# Patient Record
Sex: FEMALE | Race: WHITE | NOT HISPANIC OR LATINO | Employment: UNEMPLOYED | URBAN - METROPOLITAN AREA
[De-identification: names, ages, dates, MRNs, and addresses within clinical notes are randomized per-mention and may not be internally consistent; named-entity substitution may affect disease eponyms.]

---

## 2017-02-22 ENCOUNTER — TELEPHONE (OUTPATIENT)
Dept: FAMILY MEDICINE CLINIC | Facility: CLINIC | Age: 27
End: 2017-02-22

## 2017-02-22 RX ORDER — OSELTAMIVIR PHOSPHATE 75 MG/1
75 CAPSULE ORAL DAILY
Qty: 10 CAPSULE | Refills: 0 | Status: SHIPPED | OUTPATIENT
Start: 2017-02-22 | End: 2017-04-27

## 2017-02-22 NOTE — TELEPHONE ENCOUNTER
----- Message from Doretha Canales sent at 2/22/2017 11:55 AM EST -----  Regarding: RX REQUEST  PT;S DAUGHTER WAS PROVEN TO HAVE THE FLU.     HER DAUGHTER'S PEDIA. SUGGESTED THE MOTHER GET TAMIFLU TO PREVENT GETTING THE FLU HERSELF.

## 2017-04-27 ENCOUNTER — OFFICE VISIT (OUTPATIENT)
Dept: OBSTETRICS AND GYNECOLOGY | Age: 27
End: 2017-04-27

## 2017-04-27 VITALS
DIASTOLIC BLOOD PRESSURE: 74 MMHG | BODY MASS INDEX: 30.39 KG/M2 | WEIGHT: 178 LBS | SYSTOLIC BLOOD PRESSURE: 118 MMHG | HEIGHT: 64 IN

## 2017-04-27 DIAGNOSIS — Z01.419 ENCOUNTER FOR GYNECOLOGICAL EXAMINATION WITHOUT ABNORMAL FINDING: Primary | ICD-10-CM

## 2017-04-27 DIAGNOSIS — Z11.3 SCREEN FOR SEXUALLY TRANSMITTED DISEASES: ICD-10-CM

## 2017-04-27 DIAGNOSIS — Z12.4 ROUTINE CERVICAL SMEAR: ICD-10-CM

## 2017-04-27 PROCEDURE — 99395 PREV VISIT EST AGE 18-39: CPT | Performed by: OBSTETRICS & GYNECOLOGY

## 2017-04-27 RX ORDER — ETONOGESTREL/ETHINYL ESTRADIOL .12-.015MG
1 RING, VAGINAL VAGINAL
Qty: 1 EACH | Refills: 11 | Status: SHIPPED | OUTPATIENT
Start: 2017-04-27 | End: 2018-03-12

## 2017-04-27 NOTE — PROGRESS NOTES
"Subjective     Chief Complaint   Patient presents with   • Gynecologic Exam     AC       History of Present Illness    Sindhu Devries is a 27 y.o.  who presents for annual exam. Pt is a homemaker.  Girls - 2 and 3 y/o  She uses the ring for contraception. She was using contiuously - having breast tenderness. No caffine.   Her menses are regular every 28-30 days, lasting 0-3 days, dysmenorrhea none   Obstetric History:  OB History      Para Term  AB TAB SAB Ectopic Multiple Living    2 2 2       2         Menstrual History:     Patient's last menstrual period was 2017.         Current contraception: NuvaRing vaginal inserts  History of abnormal Pap smear: no  Received Gardasil immunization: no  Perform regular self breast exam: yes  Family history of uterine or ovarian cancer: no  Family History of colon cancer: no  Family history of breast cancer: no    Mammogram: not indicated.  Colonoscopy: not indicated.  DEXA: not indicated.    Exercise: not active  Calcium/Vitamin D: adequate intake    The following portions of the patient's history were reviewed and updated as appropriate: allergies, current medications, past family history, past medical history, past social history, past surgical history and problem list.    Review of Systems    Review of Systems   Constitutional: Negative for fatigue.   Respiratory: Negative for shortness of breath.    Gastrointestinal: Negative for abdominal pain.   Genitourinary: Negative for dysuria.   Neurological: Negative for headaches.   Psychiatric/Behavioral: Negative for dysphoric mood.         Objective   Physical Exam    /74  Ht 64\" (162.6 cm)  Wt 178 lb (80.7 kg)  LMP 2017  BMI 30.55 kg/m2    General:   alert, appears stated age and cooperative   Neck: no asymmetry, masses, or scars, no adenopathy and thyroid normal to palpation   Heart: regular rate and rhythm   Lungs: clear to auscultation bilaterally   Abdomen: soft, non-tender, " without masses or organomegaly   Breast: inspection negative, no nipple discharge or bleeding, no masses or nodularity palpable   Vulva: normal, Bartholin's, Urethra, [place]'s normal   Vagina: normal mucosa, normal discharge   Cervix: no cervical motion tenderness and no lesions   Uterus: mobile, non-tender, normal shape and consistency   Adnexa: no mass, fullness, tenderness   Rectal: not indicated     Assessment/Plan   Sindhu was seen today for gynecologic exam.    Diagnoses and all orders for this visit:    Encounter for gynecological examination without abnormal finding    Breast tenderness - try vit E   All questions answered.  Breast self exam technique reviewed and patient encouraged to perform self-exam monthly.  Discussed healthy lifestyle modifications.  Recommended 30 minutes of aerobic exercise five times per week.  Discussed calcium needs to prevent osteoporosis.

## 2017-05-02 ENCOUNTER — TELEPHONE (OUTPATIENT)
Dept: OBSTETRICS AND GYNECOLOGY | Age: 27
End: 2017-05-02

## 2017-05-02 LAB
C TRACH RRNA CVX QL NAA+PROBE: NEGATIVE
CONV .: NORMAL
CYTOLOGIST CVX/VAG CYTO: NORMAL
CYTOLOGY CVX/VAG DOC THIN PREP: NORMAL
DX ICD CODE: NORMAL
HIV 1 & 2 AB SER-IMP: NORMAL
N GONORRHOEA RRNA CVX QL NAA+PROBE: NEGATIVE
OTHER STN SPEC: NORMAL
PATH REPORT.FINAL DX SPEC: NORMAL
STAT OF ADQ CVX/VAG CYTO-IMP: NORMAL

## 2017-05-11 ENCOUNTER — OFFICE VISIT (OUTPATIENT)
Dept: RETAIL CLINIC | Facility: CLINIC | Age: 27
End: 2017-05-11

## 2017-05-11 VITALS
TEMPERATURE: 98 F | RESPIRATION RATE: 16 BRPM | OXYGEN SATURATION: 98 % | HEART RATE: 71 BPM | DIASTOLIC BLOOD PRESSURE: 83 MMHG | SYSTOLIC BLOOD PRESSURE: 134 MMHG

## 2017-05-11 DIAGNOSIS — J06.9 ACUTE URI: Primary | ICD-10-CM

## 2017-05-11 DIAGNOSIS — J02.8 ACUTE PHARYNGITIS DUE TO OTHER SPECIFIED ORGANISMS: ICD-10-CM

## 2017-05-11 LAB
EXPIRATION DATE: NORMAL
INTERNAL CONTROL: NORMAL
Lab: NORMAL
S PYO AG THROAT QL: NEGATIVE

## 2017-05-11 PROCEDURE — 99213 OFFICE O/P EST LOW 20 MIN: CPT | Performed by: NURSE PRACTITIONER

## 2017-05-11 PROCEDURE — 87880 STREP A ASSAY W/OPTIC: CPT | Performed by: NURSE PRACTITIONER

## 2017-05-13 ENCOUNTER — TELEPHONE (OUTPATIENT)
Dept: RETAIL CLINIC | Facility: CLINIC | Age: 27
End: 2017-05-13

## 2017-05-13 RX ORDER — ONDANSETRON 4 MG/1
4 TABLET, FILM COATED ORAL EVERY 8 HOURS PRN
Qty: 9 TABLET | Refills: 0 | Status: SHIPPED | OUTPATIENT
Start: 2017-05-13 | End: 2017-05-16

## 2017-05-13 RX ORDER — AMOXICILLIN 875 MG/1
875 TABLET, COATED ORAL 2 TIMES DAILY
Qty: 20 TABLET | Refills: 0 | Status: SHIPPED | OUTPATIENT
Start: 2017-05-13 | End: 2017-05-23

## 2017-07-05 ENCOUNTER — OFFICE VISIT (OUTPATIENT)
Dept: RETAIL CLINIC | Facility: CLINIC | Age: 27
End: 2017-07-05

## 2017-07-05 DIAGNOSIS — J01.00 ACUTE NON-RECURRENT MAXILLARY SINUSITIS: ICD-10-CM

## 2017-07-05 DIAGNOSIS — H66.003 ACUTE SUPPURATIVE OTITIS MEDIA OF BOTH EARS WITHOUT SPONTANEOUS RUPTURE OF TYMPANIC MEMBRANES, RECURRENCE NOT SPECIFIED: Primary | ICD-10-CM

## 2017-07-05 PROCEDURE — 99213 OFFICE O/P EST LOW 20 MIN: CPT | Performed by: NURSE PRACTITIONER

## 2017-07-05 RX ORDER — AMOXICILLIN 400 MG/5ML
POWDER, FOR SUSPENSION ORAL
Qty: 210 ML | Refills: 0 | Status: SHIPPED | OUTPATIENT
Start: 2017-07-05 | End: 2018-03-12

## 2017-07-05 NOTE — PROGRESS NOTES
Subjective   Sindhu Devries is a 27 y.o. female.     HPI Comments: Pt reports symptoms started 4 weeks ago sinus pressure and mild headache. Coughing at night that has since resolved.     Earache    There is pain in the right ear. This is a new problem. The current episode started 1 to 4 weeks ago (4 weeks ). The problem occurs constantly. The problem has been unchanged. There has been no fever. The pain is at a severity of 8/10. The pain is moderate. Associated symptoms include coughing, headaches and a sore throat. Pertinent negatives include no abdominal pain, ear discharge, neck pain, rash or vomiting. Treatments tried: mucinex  The treatment provided mild relief.        The following portions of the patient's history were reviewed and updated as appropriate: allergies, current medications, past family history, past medical history, past social history, past surgical history and problem list.    Review of Systems   Constitutional: Negative.    HENT: Positive for ear pain and sore throat. Negative for ear discharge.    Eyes: Negative.    Respiratory: Positive for cough.    Cardiovascular: Negative.    Gastrointestinal: Negative.  Negative for abdominal pain and vomiting.   Endocrine: Negative.    Genitourinary: Negative.    Musculoskeletal: Negative.  Negative for neck pain.   Skin: Negative.  Negative for rash.   Allergic/Immunologic: Negative.    Neurological: Positive for headaches.   Hematological: Negative.    Psychiatric/Behavioral: Negative.        Objective   Physical Exam   Constitutional: She is oriented to person, place, and time. Vital signs are normal. She appears well-developed and well-nourished.   HENT:   Head: Normocephalic and atraumatic.   Right Ear: Hearing, external ear and ear canal normal. Tympanic membrane is erythematous.   Left Ear: Hearing, external ear and ear canal normal. Tympanic membrane is erythematous.   Nose: Right sinus exhibits maxillary sinus tenderness. Right sinus exhibits no  frontal sinus tenderness. Left sinus exhibits maxillary sinus tenderness. Left sinus exhibits no frontal sinus tenderness.   Mouth/Throat: Uvula is midline and mucous membranes are normal. Posterior oropharyngeal erythema present. No tonsillar exudate.   Eyes: Conjunctivae and lids are normal. Pupils are equal, round, and reactive to light.   Neck: Trachea normal and normal range of motion. Neck supple.   Cardiovascular: Normal rate, regular rhythm, S1 normal, S2 normal and normal heart sounds.    Pulmonary/Chest: Effort normal and breath sounds normal.   Abdominal: Soft. Normal appearance and bowel sounds are normal. There is no tenderness.   Musculoskeletal: Normal range of motion.   Lymphadenopathy:     She has no cervical adenopathy.   Neurological: She is alert and oriented to person, place, and time.   Skin: Skin is warm, dry and intact. No rash noted.   Psychiatric: She has a normal mood and affect. Her speech is normal and behavior is normal.   Vitals reviewed.      Assessment/Plan   Problems Addressed this Visit     None      Visit Diagnoses     Acute suppurative otitis media of both ears without spontaneous rupture of tympanic membranes, recurrence not specified    -  Primary    Acute non-recurrent maxillary sinusitis

## 2017-07-05 NOTE — PATIENT INSTRUCTIONS

## 2017-11-16 ENCOUNTER — TELEPHONE (OUTPATIENT)
Dept: OBSTETRICS AND GYNECOLOGY | Age: 27
End: 2017-11-16

## 2017-11-16 NOTE — TELEPHONE ENCOUNTER
[facility]. Pt is currently trying to have a baby and wanted to make sure it was okay for her to take Azithromycin.

## 2018-03-12 ENCOUNTER — PROCEDURE VISIT (OUTPATIENT)
Dept: OBSTETRICS AND GYNECOLOGY | Age: 28
End: 2018-03-12

## 2018-03-12 ENCOUNTER — INITIAL PRENATAL (OUTPATIENT)
Dept: OBSTETRICS AND GYNECOLOGY | Age: 28
End: 2018-03-12

## 2018-03-12 VITALS — WEIGHT: 191 LBS | SYSTOLIC BLOOD PRESSURE: 152 MMHG | DIASTOLIC BLOOD PRESSURE: 96 MMHG | BODY MASS INDEX: 32.79 KG/M2

## 2018-03-12 DIAGNOSIS — O36.80X0 ENCOUNTER TO DETERMINE FETAL VIABILITY OF PREGNANCY, SINGLE OR UNSPECIFIED FETUS: ICD-10-CM

## 2018-03-12 DIAGNOSIS — Z3A.09 9 WEEKS GESTATION OF PREGNANCY: Primary | ICD-10-CM

## 2018-03-12 DIAGNOSIS — Z11.3 SCREEN FOR STD (SEXUALLY TRANSMITTED DISEASE): ICD-10-CM

## 2018-03-12 DIAGNOSIS — Z34.81 ENCOUNTER FOR SUPERVISION OF OTHER NORMAL PREGNANCY IN FIRST TRIMESTER: ICD-10-CM

## 2018-03-12 PROBLEM — Z34.90 PREGNANCY: Status: ACTIVE | Noted: 2018-03-12

## 2018-03-12 LAB — VZV IGG SER QL: NORMAL

## 2018-03-12 PROCEDURE — 0501F PRENATAL FLOW SHEET: CPT | Performed by: OBSTETRICS & GYNECOLOGY

## 2018-03-12 PROCEDURE — 76817 TRANSVAGINAL US OBSTETRIC: CPT | Performed by: OBSTETRICS & GYNECOLOGY

## 2018-03-12 RX ORDER — DOCONEXENT, NIACINAMIDE, .ALPHA.-TOCOPHEROL ACETATE, DL-, CHOLECALCIFEROL, .BETA.-CAROTENE, ASCORBIC ACID, THIAMINE MONONITRATE, RIBOFLAVIN, PYRIDOXINE HYDROCHLORIDE, CYANOCOBALAMIN, IRON, ZINC OXIDE, CUPRIC OXIDE, POTASSIUM IODIDE, MAGNESIUM OXIDE, FOLIC ACID, AND LEVOMEFOLATE CALCIUM 200; 15; 20; 1000; 1100; 30; 1.6; 1.8; 2.5; 12; 29; 25; 2; 150; 20; .4; .6 MG/1; MG/1; [IU]/1; [IU]/1; [IU]/1; MG/1; MG/1; MG/1; MG/1; UG/1; MG/1; MG/1; MG/1; UG/1; MG/1; MG/1; MG/1
200 CAPSULE, LIQUID FILLED ORAL DAILY
Qty: 30 CAPSULE | Refills: 11 | Status: SHIPPED | OUTPATIENT
Start: 2018-03-12 | End: 2019-12-19

## 2018-03-12 RX ORDER — DOXYLAMINE SUCCINATE AND PYRIDOXINE HYDROCHLORIDE, DELAYED RELEASE TABLETS 10 MG/10 MG 10; 10 MG/1; MG/1
TABLET, DELAYED RELEASE ORAL
Qty: 100 TABLET | Refills: 3 | Status: SHIPPED | OUTPATIENT
Start: 2018-03-12 | End: 2018-05-30

## 2018-03-12 RX ORDER — FERROUS SULFATE 325(65) MG
325 TABLET ORAL
Qty: 30 TABLET | Refills: 9 | Status: SHIPPED | OUTPATIENT
Start: 2018-03-12 | End: 2018-10-12 | Stop reason: HOSPADM

## 2018-03-12 NOTE — PROGRESS NOTES
Subjective     Sindhu Devries is being seen today for her first obstetrical visit.  The patient has had 2 term vaginal deliveries of 2 daughters.  Her prior pregnancies were uncomplicated.  She does desire first trimester testing.  She also desires an induction of labor.  She is sure of her last menstrual period.  She is having some nausea but no vaginal bleeding.  This is a planned pregnancy. She is at 9w2d gestation.  She is a homemaker.  She previously had chickenpox as a child.    Menstrual History:  OB History      Para Term  AB Living    3 2 2   2    SAB TAB Ectopic Molar Multiple Live Births         2        Obstetric Comments    No complications. Term inductions.            Patient's last menstrual period was 2018 (exact date).       The following portions of the patient's history were reviewed and updated as appropriate: allergies, current medications, past family history, past medical history, past social history, past surgical history and problem list.    Review of Systems  A comprehensive review of systems was negative except for: nausea      Objective     /96   Wt 86.6 kg (191 lb)   LMP 2018 (Exact Date)   BMI 32.79 kg/m²   General appearance: alert, appears stated age and cooperative  Neck: no adenopathy, no carotid bruit and thyroid not enlarged, symmetric, no tenderness/mass/nodules  Heart: regular rate and rhythm  Abdomen: soft, non-tender; bowel sounds normal; no masses,  no organomegaly  Pelvic: cervix normal in appearance, external genitalia normal, no adnexal masses or tenderness, no cervical motion tenderness, rectovaginal septum normal, uterus normal size, shape, and consistency and vagina normal without discharge      Assessment     Pregnancy at 9 and 2/7 weeks      Plan     Initial labs drawn.  Prenatal vitamins.  Problem list reviewed and updated.  OB information was reviewed.  We discussed third pregnancies can be more uncomfortable.  Patient desires term  induction of labor.  She is traveling to [place] in August.  We discussed the risk of mosquito  viruses.  Follow up in 4 weeks.  .

## 2018-03-13 ENCOUNTER — TELEPHONE (OUTPATIENT)
Dept: OBSTETRICS AND GYNECOLOGY | Age: 28
End: 2018-03-13

## 2018-03-13 LAB
ABO GROUP BLD: (no result)
BASOPHILS # BLD AUTO: 0 X10E3/UL (ref 0–0.2)
BASOPHILS NFR BLD AUTO: 1 %
BLD GP AB SCN SERPL QL: NEGATIVE
C TRACH RRNA SPEC QL NAA+PROBE: NEGATIVE
EOSINOPHIL # BLD AUTO: 0.1 X10E3/UL (ref 0–0.4)
EOSINOPHIL NFR BLD AUTO: 2 %
ERYTHROCYTE [DISTWIDTH] IN BLOOD BY AUTOMATED COUNT: 13.1 % (ref 12.3–15.4)
HBV SURFACE AG SERPL QL IA: NEGATIVE
HCT VFR BLD AUTO: 37.3 % (ref 34–46.6)
HGB BLD-MCNC: 12.9 G/DL (ref 11.1–15.9)
HIV 1+2 AB+HIV1 P24 AG SERPL QL IA: NON REACTIVE
IMM GRANULOCYTES # BLD: 0 X10E3/UL (ref 0–0.1)
IMM GRANULOCYTES NFR BLD: 0 %
LYMPHOCYTES # BLD AUTO: 1.6 X10E3/UL (ref 0.7–3.1)
LYMPHOCYTES NFR BLD AUTO: 24 %
MCH RBC QN AUTO: 30 PG (ref 26.6–33)
MCHC RBC AUTO-ENTMCNC: 34.6 G/DL (ref 31.5–35.7)
MCV RBC AUTO: 87 FL (ref 79–97)
MONOCYTES # BLD AUTO: 0.3 X10E3/UL (ref 0.1–0.9)
MONOCYTES NFR BLD AUTO: 5 %
N GONORRHOEA RRNA SPEC QL NAA+PROBE: NEGATIVE
NEUTROPHILS # BLD AUTO: 4.6 X10E3/UL (ref 1.4–7)
NEUTROPHILS NFR BLD AUTO: 68 %
PLATELET # BLD AUTO: 306 X10E3/UL (ref 150–379)
RBC # BLD AUTO: 4.3 X10E6/UL (ref 3.77–5.28)
RH BLD: POSITIVE
RPR SER QL: NON REACTIVE
RUBV IGG SERPL IA-ACNC: 1.91 INDEX
WBC # BLD AUTO: 6.7 X10E3/UL (ref 3.4–10.8)

## 2018-03-14 LAB
BACTERIA UR CULT: NORMAL
BACTERIA UR CULT: NORMAL

## 2018-03-20 ENCOUNTER — TELEPHONE (OUTPATIENT)
Dept: OBSTETRICS AND GYNECOLOGY | Age: 28
End: 2018-03-20

## 2018-03-26 ENCOUNTER — TELEPHONE (OUTPATIENT)
Dept: OBSTETRICS AND GYNECOLOGY | Age: 28
End: 2018-03-26

## 2018-03-26 NOTE — TELEPHONE ENCOUNTER
----- Message from Chilango Mccarty MD sent at 3/26/2018 10:22 AM EDT -----  Please notify first trimester testing is normal.  Notify of gender if the patient would like to know.

## 2018-03-28 ENCOUNTER — TELEPHONE (OUTPATIENT)
Dept: OBSTETRICS AND GYNECOLOGY | Age: 28
End: 2018-03-28

## 2018-04-09 ENCOUNTER — ROUTINE PRENATAL (OUTPATIENT)
Dept: OBSTETRICS AND GYNECOLOGY | Age: 28
End: 2018-04-09

## 2018-04-09 VITALS — DIASTOLIC BLOOD PRESSURE: 80 MMHG | WEIGHT: 192 LBS | SYSTOLIC BLOOD PRESSURE: 124 MMHG | BODY MASS INDEX: 32.96 KG/M2

## 2018-04-09 DIAGNOSIS — Z34.02 ENCOUNTER FOR SUPERVISION OF NORMAL FIRST PREGNANCY IN SECOND TRIMESTER: Primary | ICD-10-CM

## 2018-04-09 PROCEDURE — 0502F SUBSEQUENT PRENATAL CARE: CPT | Performed by: OBSTETRICS & GYNECOLOGY

## 2018-04-09 NOTE — PROGRESS NOTES
Patient is still having some nausea and vomiting on and off.  She is taking B6 and antihistamines.  She is also having some acne on her back.  First trimester testing was normal.  Baby is a girl.    Positive Doppler heart tones today.    Assessment-13 weeks normal pregnancy in third girl.  Discussed frequent small meals and foods to try.  She will continue with B6.  AFP at next visit.

## 2018-05-09 ENCOUNTER — ROUTINE PRENATAL (OUTPATIENT)
Dept: OBSTETRICS AND GYNECOLOGY | Age: 28
End: 2018-05-09

## 2018-05-09 VITALS — DIASTOLIC BLOOD PRESSURE: 80 MMHG | BODY MASS INDEX: 33.3 KG/M2 | WEIGHT: 194 LBS | SYSTOLIC BLOOD PRESSURE: 120 MMHG

## 2018-05-09 DIAGNOSIS — Z34.82 ENCOUNTER FOR SUPERVISION OF OTHER NORMAL PREGNANCY IN SECOND TRIMESTER: Primary | ICD-10-CM

## 2018-05-09 PROCEDURE — 0502F SUBSEQUENT PRENATAL CARE: CPT | Performed by: OBSTETRICS & GYNECOLOGY

## 2018-05-09 NOTE — PROGRESS NOTES
Chief Complaint   Patient presents with   • Routine Prenatal Visit     HPI- Pt is 28 y.o.  at 17w4d here for prenatal visit. Nausea is better.  C/o restless legs   ROS-     - No vaginal bleeding    GI- No abdominal pain  /80   Wt 88 kg (194 lb)   LMP 2018 (Exact Date)   BMI 33.30 kg/m²   Exam - See flow sheet  Normal weight gain   Assessment-  17 weeks - AFP and add iron

## 2018-05-12 LAB
AFP ADJ MOM SERPL: 0.87
AFP INTERP SERPL-IMP: NORMAL
AFP INTERP SERPL-IMP: NORMAL
AFP SERPL-MCNC: 29.8 NG/ML
AGE AT DELIVERY: 26.5 YR
GA METHOD: NORMAL
GA: 17.6 WEEKS
IDDM PATIENT QL: NO
LABORATORY COMMENT REPORT: NORMAL
MULTIPLE PREGNANCY: NO
NEURAL TUBE DEFECT RISK FETUS: NORMAL %
RESULT: NORMAL

## 2018-05-14 ENCOUNTER — TELEPHONE (OUTPATIENT)
Dept: OBSTETRICS AND GYNECOLOGY | Age: 28
End: 2018-05-14

## 2018-05-30 ENCOUNTER — ROUTINE PRENATAL (OUTPATIENT)
Dept: OBSTETRICS AND GYNECOLOGY | Age: 28
End: 2018-05-30

## 2018-05-30 ENCOUNTER — PROCEDURE VISIT (OUTPATIENT)
Dept: OBSTETRICS AND GYNECOLOGY | Age: 28
End: 2018-05-30

## 2018-05-30 VITALS — DIASTOLIC BLOOD PRESSURE: 74 MMHG | WEIGHT: 194 LBS | BODY MASS INDEX: 33.3 KG/M2 | SYSTOLIC BLOOD PRESSURE: 108 MMHG

## 2018-05-30 DIAGNOSIS — O26.812 PREGNANCY RELATED FATIGUE IN SECOND TRIMESTER: Primary | ICD-10-CM

## 2018-05-30 DIAGNOSIS — G25.81 RESTLESS LEG: ICD-10-CM

## 2018-05-30 DIAGNOSIS — Z34.92 SECOND TRIMESTER FETUS: Primary | ICD-10-CM

## 2018-05-30 PROCEDURE — 0502F SUBSEQUENT PRENATAL CARE: CPT | Performed by: OBSTETRICS & GYNECOLOGY

## 2018-05-30 PROCEDURE — 76805 OB US >/= 14 WKS SNGL FETUS: CPT | Performed by: OBSTETRICS & GYNECOLOGY

## 2018-05-30 NOTE — PROGRESS NOTES
Patient is here today for anatomy ultrasound.  She has started iron but is still having complaint of restless legs.  She would like to have blood work drawn today.  No vaginal bleeding and positive fetal movement.    Anatomy ultrasound shows female gender.  Size greater than dates by 5 days.  Cervical length is normal at 4.6.  Anatomy is normal within the limits of ultrasound.  Baby is vertex.  Placenta is anterior with no previa.    Assessment-20 weeks  We will check CBC and ferritin today due to restless legs.  Patient will return in 4 weeks.

## 2018-05-31 ENCOUNTER — TELEPHONE (OUTPATIENT)
Dept: OBSTETRICS AND GYNECOLOGY | Age: 28
End: 2018-05-31

## 2018-05-31 LAB
ERYTHROCYTE [DISTWIDTH] IN BLOOD BY AUTOMATED COUNT: 13.8 % (ref 11.7–13)
FERRITIN SERPL-MCNC: 19.94 NG/ML (ref 13–150)
HCT VFR BLD AUTO: 33.8 % (ref 35.6–45.5)
HGB BLD-MCNC: 11 G/DL (ref 11.9–15.5)
MCH RBC QN AUTO: 30.3 PG (ref 26.9–32)
MCHC RBC AUTO-ENTMCNC: 32.5 G/DL (ref 32.4–36.3)
MCV RBC AUTO: 93.1 FL (ref 80.5–98.2)
PLATELET # BLD AUTO: 225 10*3/MM3 (ref 140–500)
RBC # BLD AUTO: 3.63 10*6/MM3 (ref 3.9–5.2)
WBC # BLD AUTO: 7.52 10*3/MM3 (ref 4.5–10.7)

## 2018-05-31 NOTE — TELEPHONE ENCOUNTER
----- Message from Chilango Mccarty MD sent at 5/31/2018  8:30 AM EDT -----  Please notify hemoglobin is low at 11.  Consistent with mild anemia.  Patient should continue her iron daily.

## 2018-06-19 ENCOUNTER — TELEPHONE (OUTPATIENT)
Dept: OBSTETRICS AND GYNECOLOGY | Age: 28
End: 2018-06-19

## 2018-06-19 NOTE — TELEPHONE ENCOUNTER
Dr Lopez pt, 23 wks preg. Pt states urgency to use restroom but hardly goes. Burn with urination, pharm on file.    Pt # [phone]

## 2018-06-25 ENCOUNTER — ROUTINE PRENATAL (OUTPATIENT)
Dept: OBSTETRICS AND GYNECOLOGY | Age: 28
End: 2018-06-25

## 2018-06-25 VITALS — DIASTOLIC BLOOD PRESSURE: 88 MMHG | WEIGHT: 197 LBS | SYSTOLIC BLOOD PRESSURE: 122 MMHG | BODY MASS INDEX: 33.81 KG/M2

## 2018-06-25 DIAGNOSIS — Z34.82 ENCOUNTER FOR SUPERVISION OF OTHER NORMAL PREGNANCY IN SECOND TRIMESTER: Primary | ICD-10-CM

## 2018-06-25 DIAGNOSIS — N30.00 ACUTE CYSTITIS WITHOUT HEMATURIA: ICD-10-CM

## 2018-06-25 PROBLEM — N39.0 UTI (URINARY TRACT INFECTION): Status: ACTIVE | Noted: 2018-06-25

## 2018-06-25 PROCEDURE — 0502F SUBSEQUENT PRENATAL CARE: CPT | Performed by: OBSTETRICS & GYNECOLOGY

## 2018-06-25 NOTE — PROGRESS NOTES
Patient came in with dysuria last week.  Her urine culture showed Escherichia coli.  Patient is currently on Keflex for 7 days.  Her UTI symptoms have resolved.  She does have some pressure in her right ear which is improving with Tylenol.  She notes good fetal movement.  No vaginal bleeding or contractions.    Exam-normal Doppler heart tones and fundal height.  Blood pressure is normal today.    Assessment-24 weeks  Complete antibiotics for UTI.  Continue Tylenol and fluids for year pressure.  If worsens patient will go to primary care.

## 2018-07-09 ENCOUNTER — APPOINTMENT (OUTPATIENT)
Dept: ULTRASOUND IMAGING | Facility: HOSPITAL | Age: 28
End: 2018-07-09

## 2018-07-09 ENCOUNTER — HOSPITAL ENCOUNTER (INPATIENT)
Facility: HOSPITAL | Age: 28
LOS: 2 days | Discharge: HOME | End: 2018-07-11
Attending: OBSTETRICS & GYNECOLOGY | Admitting: OBSTETRICS & GYNECOLOGY

## 2018-07-09 ENCOUNTER — TELEPHONE (OUTPATIENT)
Dept: OBSTETRICS AND GYNECOLOGY | Age: 28
End: 2018-07-09

## 2018-07-09 DIAGNOSIS — O26.899 ABDOMINAL PAIN DURING PREGNANCY, ANTEPARTUM: ICD-10-CM

## 2018-07-09 DIAGNOSIS — R10.9 ABDOMINAL PAIN DURING PREGNANCY, ANTEPARTUM: ICD-10-CM

## 2018-07-09 DIAGNOSIS — Z3A.26 26 WEEKS GESTATION OF PREGNANCY: ICD-10-CM

## 2018-07-09 DIAGNOSIS — N39.0 COMPLICATED UTI (URINARY TRACT INFECTION): Primary | ICD-10-CM

## 2018-07-09 LAB
ALBUMIN SERPL-MCNC: 3.7 G/DL (ref 3.5–5.2)
ALBUMIN/GLOB SERPL: 1.2 G/DL
ALP SERPL-CCNC: 96 U/L (ref 39–117)
ALT SERPL W P-5'-P-CCNC: 7 U/L (ref 1–33)
ANION GAP SERPL CALCULATED.3IONS-SCNC: 14.9 MMOL/L
AST SERPL-CCNC: 10 U/L (ref 1–32)
BACTERIA UR QL AUTO: ABNORMAL /HPF
BASOPHILS # BLD AUTO: 0.02 10*3/MM3 (ref 0–0.2)
BASOPHILS NFR BLD AUTO: 0.2 % (ref 0–1.5)
BILIRUB SERPL-MCNC: 0.6 MG/DL (ref 0.1–1.2)
BILIRUB UR QL STRIP: NEGATIVE
BUN BLD-MCNC: 7 MG/DL (ref 6–20)
BUN/CREAT SERPL: 12.1 (ref 7–25)
CALCIUM SPEC-SCNC: 8.9 MG/DL (ref 8.6–10.5)
CHLORIDE SERPL-SCNC: 101 MMOL/L (ref 98–107)
CLARITY UR: ABNORMAL
CO2 SERPL-SCNC: 21.1 MMOL/L (ref 22–29)
COLOR UR: YELLOW
CREAT BLD-MCNC: 0.58 MG/DL (ref 0.57–1)
DEPRECATED RDW RBC AUTO: 43.8 FL (ref 37–54)
EOSINOPHIL # BLD AUTO: 0.08 10*3/MM3 (ref 0–0.7)
EOSINOPHIL NFR BLD AUTO: 0.9 % (ref 0.3–6.2)
ERYTHROCYTE [DISTWIDTH] IN BLOOD BY AUTOMATED COUNT: 13.2 % (ref 11.7–13)
GFR SERPL CREATININE-BSD FRML MDRD: 126 ML/MIN/1.73
GLOBULIN UR ELPH-MCNC: 3.2 GM/DL
GLUCOSE BLD-MCNC: 70 MG/DL (ref 65–99)
GLUCOSE UR STRIP-MCNC: NEGATIVE MG/DL
HCT VFR BLD AUTO: 32.8 % (ref 35.6–45.5)
HGB BLD-MCNC: 10.9 G/DL (ref 11.9–15.5)
HGB UR QL STRIP.AUTO: ABNORMAL
HYALINE CASTS UR QL AUTO: ABNORMAL /LPF
IMM GRANULOCYTES # BLD: 0.03 10*3/MM3 (ref 0–0.03)
IMM GRANULOCYTES NFR BLD: 0.3 % (ref 0–0.5)
KETONES UR QL STRIP: ABNORMAL
LEUKOCYTE ESTERASE UR QL STRIP.AUTO: ABNORMAL
LYMPHOCYTES # BLD AUTO: 1.53 10*3/MM3 (ref 0.9–4.8)
LYMPHOCYTES NFR BLD AUTO: 16.6 % (ref 19.6–45.3)
MCH RBC QN AUTO: 30.2 PG (ref 26.9–32)
MCHC RBC AUTO-ENTMCNC: 33.2 G/DL (ref 32.4–36.3)
MCV RBC AUTO: 90.9 FL (ref 80.5–98.2)
MONOCYTES # BLD AUTO: 0.51 10*3/MM3 (ref 0.2–1.2)
MONOCYTES NFR BLD AUTO: 5.5 % (ref 5–12)
NEUTROPHILS # BLD AUTO: 7.1 10*3/MM3 (ref 1.9–8.1)
NEUTROPHILS NFR BLD AUTO: 76.8 % (ref 42.7–76)
NITRITE UR QL STRIP: POSITIVE
PH UR STRIP.AUTO: 6 [PH] (ref 5–8)
PLATELET # BLD AUTO: 249 10*3/MM3 (ref 140–500)
PMV BLD AUTO: 10.4 FL (ref 6–12)
POTASSIUM BLD-SCNC: 4.6 MMOL/L (ref 3.5–5.2)
PROT SERPL-MCNC: 6.9 G/DL (ref 6–8.5)
PROT UR QL STRIP: ABNORMAL
RBC # BLD AUTO: 3.61 10*6/MM3 (ref 3.9–5.2)
RBC # UR: ABNORMAL /HPF
REF LAB TEST METHOD: ABNORMAL
SODIUM BLD-SCNC: 137 MMOL/L (ref 136–145)
SP GR UR STRIP: 1.02 (ref 1–1.03)
SQUAMOUS #/AREA URNS HPF: ABNORMAL /HPF
UROBILINOGEN UR QL STRIP: ABNORMAL
WBC NRBC COR # BLD: 9.24 10*3/MM3 (ref 4.5–10.7)
WBC UR QL AUTO: ABNORMAL /HPF

## 2018-07-09 PROCEDURE — 99221 1ST HOSP IP/OBS SF/LOW 40: CPT | Performed by: OBSTETRICS & GYNECOLOGY

## 2018-07-09 PROCEDURE — 87086 URINE CULTURE/COLONY COUNT: CPT | Performed by: OBSTETRICS & GYNECOLOGY

## 2018-07-09 PROCEDURE — 76805 OB US >/= 14 WKS SNGL FETUS: CPT

## 2018-07-09 PROCEDURE — 81001 URINALYSIS AUTO W/SCOPE: CPT | Performed by: OBSTETRICS & GYNECOLOGY

## 2018-07-09 PROCEDURE — 76817 TRANSVAGINAL US OBSTETRIC: CPT

## 2018-07-09 PROCEDURE — 25010000003 CEFTRIAXONE PER 250 MG: Performed by: OBSTETRICS & GYNECOLOGY

## 2018-07-09 PROCEDURE — 85025 COMPLETE CBC W/AUTO DIFF WBC: CPT | Performed by: OBSTETRICS & GYNECOLOGY

## 2018-07-09 PROCEDURE — 87186 SC STD MICRODIL/AGAR DIL: CPT | Performed by: OBSTETRICS & GYNECOLOGY

## 2018-07-09 PROCEDURE — 80053 COMPREHEN METABOLIC PANEL: CPT | Performed by: OBSTETRICS & GYNECOLOGY

## 2018-07-09 RX ORDER — ACETAMINOPHEN 325 MG/1
325 TABLET ORAL EVERY 6 HOURS PRN
Status: DISCONTINUED | OUTPATIENT
Start: 2018-07-09 | End: 2018-07-10

## 2018-07-09 RX ORDER — CEFTRIAXONE SODIUM 2 G/50ML
2 INJECTION, SOLUTION INTRAVENOUS EVERY 24 HOURS
Status: DISCONTINUED | OUTPATIENT
Start: 2018-07-09 | End: 2018-07-10

## 2018-07-09 RX ORDER — SODIUM CHLORIDE 0.9 % (FLUSH) 0.9 %
1-10 SYRINGE (ML) INJECTION AS NEEDED
Status: DISCONTINUED | OUTPATIENT
Start: 2018-07-09 | End: 2018-07-11 | Stop reason: HOSPADM

## 2018-07-09 RX ORDER — SODIUM CHLORIDE 9 MG/ML
125 INJECTION, SOLUTION INTRAVENOUS CONTINUOUS
Status: DISCONTINUED | OUTPATIENT
Start: 2018-07-09 | End: 2018-07-11 | Stop reason: HOSPADM

## 2018-07-09 RX ADMIN — ACETAMINOPHEN 325 MG: 325 TABLET, FILM COATED ORAL at 19:16

## 2018-07-09 RX ADMIN — CEFTRIAXONE SODIUM 2 G: 2 INJECTION, SOLUTION INTRAVENOUS at 18:16

## 2018-07-09 RX ADMIN — SODIUM CHLORIDE 125 ML/HR: 9 INJECTION, SOLUTION INTRAVENOUS at 18:05

## 2018-07-09 NOTE — NON STRESS TEST
Sindhu Devries, a  at 26w2d with an AMIRAH of 10/13/2018, by Last Menstrual Period, was seen at [facility]     No NST charge per gestation 26.2, monitoring only.    Chief Complaint   Patient presents with   • Abdominal Cramping     Pt states she has been cramping since this morning. She was treated for a UTI a few weeks ago and states that she has noticed some mild burning and frequency today. She denies leaking/bleeding.       Patient Active Problem List   Diagnosis   • Pregnancy   • Restless leg   • UTI (urinary tract infection)   • Abdominal pain during pregnancy, antepartum   • Complicated UTI (urinary tract infection)       Start Time: 1540  Stop Time: 1654

## 2018-07-09 NOTE — TELEPHONE ENCOUNTER
Pt 26 wks preg, lower abd cramping since 9 am today. Pt denies any bleeding or change in dc. Pt states she is still feeling baby move, but FM not consistent yet. Please advise.      Pt # [phone]

## 2018-07-09 NOTE — PROGRESS NOTES
Addendum: UA with positive nitrites and large leukocyte esterase. OB ultrasound shows normal growth/naila and normal cervical length at 4.5 cm. Therefore, recurrent UTI is likely source of symptoms. Will admit for IV abx with Rocephin as pt noting significant discomfort with some occ radiation of pain into flank and episodes of nausea. Prior urine culture reviewed and organism was sensitive to Rocephin. Repeat urine culture ordered and contacted lab to confirm study sent prior to starting antibiotic.

## 2018-07-09 NOTE — H&P
[facility]  Obstetric History and Physical    Chief Complaint   Patient presents with   • Abdominal Cramping     Pt states she has been cramping since this morning. She was treated for a UTI a few weeks ago and states that she has noticed some mild burning and frequency today. She denies leaking/bleeding.       Subjective     Patient is a 28 y.o. female  currently at 26w2d, who presents with midline low abdominal pain that started around 0900. She denies vaginal bleeding or leaking fluid. She notes normal fetal movement. She describes the pain as cramping. It is only located in low abdomen in midline. She notes mild nausea today but denies vomiting or diarrhea and was able to eat a normal lunch earlier. She denies recent intercourse or strenuous activity. She had a UTI a few weeks ago and reports then she had severe dysuria, frequency and urgency. She reports her current symptoms are different but she may have some minimal burning/frequency. She denies fever.     Her prenatal care is complicated by UTI 2 weeks ago.  Her previous obstetric/gynecological history is noted for 2 prior term vaginal deliveries    The following portions of the patients history were reviewed and updated as appropriate: current medications, allergies, past medical history, past surgical history, past family history, past social history and problem list .       Prenatal Information:  Prenatal Results     Initial Prenatal Labs     Test Value Reference Range Date Time    Hemoglobin 11.0 g/dL (L) 11.9 - 15.5 g/dL 18 1110    Hematocrit 33.8 % (L) 35.6 - 45.5 % 18 1110    Platelets 225 10*3/mm3 140 - 500 10*3/mm3 18 1110    Rubella IgG 1.91 index Immune >0.99 index 18 1137    Hepatitis B SAg Negative  Negative 18 1137    Hepatitis C Ab 0.2 s/co ratio 0.0 - 0.9 s/co ratio 16 1531    RPR Non Reactive  Non Reactive 18 1137    ABO O   18 1137    Rh Positive   18 1137    Antibody Screen  Negative  Negative 03/12/18 1137    HIV Non Reactive  Non Reactive 03/12/18 1137    Urine Culture Final report  (A)  06/20/18 1344    Gonorrhea Negative  Negative 03/12/18 1000    Chlamydia Negative  Negative 03/12/18 1000    TSH              2nd and 3rd Trimester     Test Value Reference Range Date Time    Hemoglobin (repeated)        Hematocrit (repeated)        GCT        Antibody Screen (repeated)        GTT Fasting        GTT 1 Hr        GTT 2 Hr        GTT 3 Hr        Group B Strep              Drug Screening     Test Value Reference Range Date Time    Amphetamine Screen        Barbiturate Screen        Benzodiazepine Screen        Methadone Screen        Phencyclidine Screen        Opiates Screen        THC Screen        Cocaine Screen        Propoxyphene Screen        Buprenorphine Screen        Methamphetamine Screen        Oxycodone Screen        Tryicyclic Antidepressants Screen              Other (Risk screening)     Test Value Reference Range Date Time    Varicella IgG positive h/o    03/12/18     Parvovirus IgG        CMV IgG        Cystic Fibrosis        Hemoglobin electrophoresis        NIPT        MSAFP-4        AFP (for NTD only) *Screen Negative*   05/09/18 1222              External Prenatal Results     Pregnancy Outside Results - Transcribed From Office Records - See Scanned Records For Details     Test Value Date Time    Hgb 11.0 g/dL (L) 05/30/18 1110    Hct 33.8 % (L) 05/30/18 1110    ABO O  03/12/18 1137    Rh Positive  03/12/18 1137    Antibody Screen Negative  03/12/18 1137    Glucose Fasting GTT       Glucose Tolerance Test 1 hour       Glucose Tolerance Test 3 hour       Gonorrhea (discrete) Negative  03/12/18 1000    Chlamydia (discrete) Negative  03/12/18 1000    RPR Non Reactive  03/12/18 1137    VDRL       Syphilis Antibody       Rubella 1.91 index 03/12/18 1137    HBsAg Negative  03/12/18 1137    Herpes Simplex Virus PCR       Herpes Simplex VIrus Culture       HIV Non Reactive   18 1137    Hep C RNA Quant PCR       Hep C Antibody 0.2 s/co ratio 16 1531    AFP 29.8 ng/mL 18 1222    Group B Strep       GBS Susceptibility to Clindamycin       GBS Susceptibility to Erythromycin       Fetal Fibronectin       Genetic Testing, Maternal Blood             Drug Screening     Test Value Date Time    Urine Drug Screen       Amphetamine Screen       Barbiturate Screen       Benzodiazepine Screen       Methadone Screen       Phencyclidine Screen       Opiates Screen       THC Screen       Cocaine Screen       Propoxyphene Screen       Buprenorphine Screen       Methamphetamine Screen       Oxycodone Screen       Tricyclic Antidepressants Screen                    Past OB History:     Obstetric History       T2      L2     SAB0   TAB0   Ectopic0   Molar0   Multiple0   Live Births2       # Outcome Date GA Lbr Christopher/2nd Weight Sex Delivery Anes PTL Lv   3 Current            2 Term 14   3289 g (7 lb 4 oz) F Vag-Spont   DIVYA      Name: Diana   1 Term 13   3685 g (8 lb 2 oz) F Vag-Spont   DIVYA      Name: Ashlyn      Obstetric Comments   No complications. Term inductions.        Past Medical History: Past Medical History:   Diagnosis Date   • Vaginal delivery       Past Surgical History Past Surgical History:   Procedure Laterality Date   • VAGINAL DELIVERY      x 2      Family History: Family History   Problem Relation Age of Onset   • Throat cancer Other    • Pancreatic cancer Maternal Grandmother    • Lung cancer Maternal Grandfather       Social History:  reports that she has never smoked. She does not have any smokeless tobacco history on file.   reports that she does not drink alcohol.   reports that she does not use drugs.        General ROS: The following systems were reviewed and negative;  constitution, respiratory, cardiovascular, integument, hematologic / lymphatic, neurological and behavioral/psych     GI: positive for nausea earlier  : positive for low  "abdominal pain and cramping    Objective       Vital Signs Range for the last 24 hours  Temperature: Temp:  [98.2 °F (36.8 °C)] 98.2 °F (36.8 °C)   Temp Source: Temp src: Oral   BP: BP: (125)/(69) 125/69   Pulse: Heart Rate:  [82] 82   Respirations: Resp:  [18] 18   SPO2:     O2 Amount (l/min):     O2 Devices     Weight:       Physical Examination: General appearance - alert, well appearing, and in no distress  Mental status - alert, oriented to person, place, and time  Neck - supple, no significant adenopathy  Chest - clear to auscultation, no wheezes, rales or rhonchi, symmetric air entry  Heart - normal rate and regular rhythm  Abdomen - gravid, soft, no guarding or rebound, positive tenderness to palpation in low mid-abdomen only  Back exam - no CVA tenderness  Neurological - alert, oriented, normal speech, no focal findings or movement disorder noted  Extremities - bilateral lower extremities without cords, tenderness or edema  Skin - normal coloration and turgor    Presentation:    Cervix: Exam by: Method: sterile exam per physician   Dilation: Cervical Dilation (cm): 0 (\"closed, thick, and high\" per MD)   Effacement:     Station:         Fetal Heart Rate Assessment   Current tracing appropriate for gestational age                              Uterine Assessment   No regular ctx noted                                        Assessment/Plan     Active Problems:    Pregnancy    Abdominal pain during pregnancy, antepartum        Assessment:  1.  Intrauterine pregnancy at 26w2d weeks gestation with reassuring fetal status.    2.  Abdominal pain in pregnancy: pt with prior UTI but reports current symptoms are different; she has focal mild tenderness on exam. Cervix is closed and ctx are not observed so PTL possible but less likely        Plan:  1. Obtain UA, CBC, CMP and OB u/s with cervical length  2. Plan of care has been reviewed with patient and her mother  3.  Risks, benefits of treatment plan have been " discussed.  4.  All questions have been answered.        Bev Antonio MD  7/9/2018  4:13 PM

## 2018-07-09 NOTE — TELEPHONE ENCOUNTER
Patient complains of severe pain extending into her back.  She did have a recent UTI.  She is instructed to go to labor and delivery for evaluation.

## 2018-07-10 PROCEDURE — 25010000002 CEFTRIAXONE PER 250 MG: Performed by: OBSTETRICS & GYNECOLOGY

## 2018-07-10 PROCEDURE — 99231 SBSQ HOSP IP/OBS SF/LOW 25: CPT | Performed by: OBSTETRICS & GYNECOLOGY

## 2018-07-10 RX ORDER — ACETAMINOPHEN 325 MG/1
325 TABLET ORAL EVERY 4 HOURS PRN
Status: DISCONTINUED | OUTPATIENT
Start: 2018-07-10 | End: 2018-07-11 | Stop reason: HOSPADM

## 2018-07-10 RX ORDER — CEFTRIAXONE SODIUM 1 G/50ML
1 INJECTION, SOLUTION INTRAVENOUS EVERY 24 HOURS
Status: DISCONTINUED | OUTPATIENT
Start: 2018-07-10 | End: 2018-07-11 | Stop reason: HOSPADM

## 2018-07-10 RX ADMIN — ACETAMINOPHEN 650 MG: 325 TABLET, FILM COATED ORAL at 00:55

## 2018-07-10 RX ADMIN — SODIUM CHLORIDE 125 ML/HR: 9 INJECTION, SOLUTION INTRAVENOUS at 18:09

## 2018-07-10 RX ADMIN — MAGNESIUM HYDROXIDE 10 ML: 2400 SUSPENSION ORAL at 16:53

## 2018-07-10 RX ADMIN — SODIUM CHLORIDE 125 ML/HR: 9 INJECTION, SOLUTION INTRAVENOUS at 10:02

## 2018-07-10 RX ADMIN — SODIUM CHLORIDE 125 ML/HR: 9 INJECTION, SOLUTION INTRAVENOUS at 02:18

## 2018-07-10 RX ADMIN — ACETAMINOPHEN 650 MG: 325 TABLET, FILM COATED ORAL at 08:11

## 2018-07-10 RX ADMIN — ACETAMINOPHEN 650 MG: 325 TABLET, FILM COATED ORAL at 14:53

## 2018-07-10 RX ADMIN — CEFTRIAXONE SODIUM 1 G: 1 INJECTION, SOLUTION INTRAVENOUS at 19:34

## 2018-07-10 NOTE — PLAN OF CARE
Problem: Patient Care Overview  Goal: Plan of Care Review  Outcome: Ongoing (interventions implemented as appropriate)   07/10/18 0604   Coping/Psychosocial   Plan of Care Reviewed With patient   Plan of Care Review   Progress improving     Goal: Individualization and Mutuality  Outcome: Ongoing (interventions implemented as appropriate)   07/10/18 0604   Individualization   Patient Specific Preferences patient would like to pick her food for meals   Patient Specific Goals (Include Timeframe) stay pregnant and go home soon   Patient Specific Interventions iv antiviotics   Mutuality/Individual Preferences   What Anxieties, Fears, Concerns, or Questions Do You Have About Your Care? concerned about her children at home       Problem: Prenatal Patient, Hospitalized (Adult,Obstetrics,Pediatric)  Intervention: Promote/Monitor Maternal/Fetal Wellbeing   07/10/18 0604   Reproductive Interventions   Fetal Wellbeing Promotion fetal heart rate monitored;fetal heart tones checked;intake and output monitored       Goal: Signs and Symptoms of Listed Potential Problems Will be Absent, Minimized or Managed (Prenatal Patient, Hospitalized)  Outcome: Ongoing (interventions implemented as appropriate)   07/10/18 0604   Goal/Outcome Evaluation   Problems Assessed (Prenatal Patient) all   Problems Present (Prenatal Patient) none

## 2018-07-10 NOTE — PROGRESS NOTES
Name:  Sindhu Devries        MR#:  [id number]      Progress Note    Brief note:  28 y.o.  at 26w3d admitted with complicated UTI - reports feeling better this morning.  Denies fever/chills, nausea/vomiting. +FM Jermaine PO. Reports abdominal pain improved.      Patient Active Problem List   Diagnosis   • Pregnancy   • Restless leg   • UTI (urinary tract infection)   • Abdominal pain during pregnancy, antepartum   • Complicated UTI (urinary tract infection)       OB History    Para Term  AB Living   3 2 2     2   SAB TAB Ectopic Molar Multiple Live Births             2      # Outcome Date GA Lbr Christopher/2nd Weight Sex Delivery Anes PTL Lv   3 Current            2 Term 14   3289 g (7 lb 4 oz) F Vag-Spont   DIVYA   1 Term 13   3685 g (8 lb 2 oz) F Vag-Spont   DIVYA      Obstetric Comments   No complications. Term inductions.      #: 1, Date: 13, Sex: Female, Weight: 3685 g (8 lb 2 oz), GA: None, Delivery: Vaginal, Spontaneous Delivery, Apgar1: None, Apgar5: None, Living: Living, Birth Comments: None    #: 2, Date: 14, Sex: Female, Weight: 3289 g (7 lb 4 oz), GA: None, Delivery: Vaginal, Spontaneous Delivery, Apgar1: None, Apgar5: None, Living: Living, Birth Comments: None    #: 3, Date: None, Sex: None, Weight: None, GA: None, Delivery: None, Apgar1: None, Apgar5: None, Living: None, Birth Comments: None      Review of Systems          Vitals:  Temperature: Temp:  [98 °F (36.7 °C)-98.6 °F (37 °C)] 98 °F (36.7 °C)  Temp Source: Temp src: Oral  BP:  BP: (105-125)/(58-69) 110/62  Pulse:  Heart Rate:  [72-82] 72  Respirations: Resp:  [16-18] 16     Physical Examination: General appearance - alert, well appearing, and in no distress  Abdomen - gravid, soft, nontender, nondistended, no masses or organomegaly  Musculoskeletal - no joint tenderness, deformity or swelling  Extremities - peripheral pulses normal, no pedal edema, no clubbing or cyanosis  Skin - normal coloration and turgor,  no rashes, no suspicious skin lesions noted    LABS:   Lab Results   Component Value Date    WBC 9.24 07/09/2018    HGB 10.9 (L) 07/09/2018    HCT 32.8 (L) 07/09/2018    MCV 90.9 07/09/2018     07/09/2018       Brief Urine Lab Results  (Last result in the past 365 days)      Color   Clarity   Blood   Leuk Est   Nitrite   Protein   CREAT   Urine HCG        07/09/18 1603 Yellow Turbid(A) Trace(A) Large (3+)(A) Positive(A) 100 mg/dL (2+)(A)             Urine Culture - Urine, [[id number]] (Order [id number])   Order   Date: 7/9/2018 Department: [facility] DELIVERY Released By: Luis Downing Authorizing: Bev Antonio MD   Reprint Order Requisition     Urine Culture - Urine, (Order #[id number]) on 7/9/18       Urine Culture - Urine,   Order: [id number] - Reflex for Order [id number]   Status:  Preliminary result   Visible to patient:  No (Not Released)   Specimen Information: Urine, Clean Catch; Urine        Urine Culture >100,000 CFU/mL Gram Negative Bacilli           Resulting Agency:  [facility]      Specimen Collected: 07/09/18 16:03   Last Resulted: 07/10/18 07:37                                  1. IUP @ 26w3d  2. Complicated UTI - symptoms improved on Rocephin - CX - GNB -await sensitivities  3. Likely home in am on oral abx      Sara Lares MD  7/10/2018 11:12 AM

## 2018-07-10 NOTE — PLAN OF CARE
"Problem: Patient Care Overview  Goal: Plan of Care Review  Outcome: Ongoing (interventions implemented as appropriate)   07/10/18 0604 07/10/18 1230 07/10/18 1709   Coping/Psychosocial   Plan of Care Reviewed With --  patient --    Plan of Care Review   Progress improving --  --    OTHER   Outcome Summary --  --  Pt pain improving. C/O lack of BM for \"couple of days\" and milk of mag given. Reports +FM. Moderate variabiltiy on EFM. Denies ctx, LOF, or VB. UOP increasing. Meds given as ordered.     Goal: Individualization and Mutuality  Outcome: Ongoing (interventions implemented as appropriate)   07/10/18 0604 07/10/18 1709   Individualization   Patient Specific Preferences patient would like to pick her food for meals --    Patient Specific Goals (Include Timeframe) stay pregnant and go home soon --    Patient Specific Interventions iv antiviotics --    Mutuality/Individual Preferences   What Anxieties, Fears, Concerns, or Questions Do You Have About Your Care? concerned about her children at home --    How Would You and/or Your Support Person Like to Participate in Your Care? --  visit pt prn   Mutuality/Individual Preferences   How to Address Anxieties/Fears --  allow visitation with children; keep updated on plan of care     Goal: Discharge Needs Assessment  Outcome: Ongoing (interventions implemented as appropriate)   07/09/18 1822 07/09/18 1824 07/10/18 1709   Discharge Needs Assessment   Readmission Within the Last 30 Days --  --  no previous admission in last 30 days   Concerns to be Addressed --  --  no discharge needs identified   Patient/Family Anticipates Transition to --  --  home   Patient/Family Anticipated Services at Transition none --  --    Transportation Anticipated car, drives self --  --    Disability   Equipment Currently Used at Home --  none --      Goal: Interprofessional Rounds/Family Conf  Outcome: Ongoing (interventions implemented as appropriate)   07/10/18 1709   Interdisciplinary " Rounds/Family Conf   Participants nursing;physician;patient       Problem: Prenatal Patient, Hospitalized (Adult,Obstetrics,Pediatric)  Intervention: Promote/Monitor Maternal/Fetal Wellbeing   07/10/18 1709   Reproductive Interventions   Fetal Wellbeing Promotion fetal heart tones checked;intake and output monitored;uterine contraction activity assessed     Intervention: Prevent/Manage DVT/VTE Risk   07/10/18 1709   Interventions   VTE Prevention/Management bilateral;sequential compression devices on     Intervention: Support/Optimize Psychosocial Response to Acute Illness During Pregnancy   07/10/18 1230   Coping/Psychosocial Interventions   Supportive Measures active listening utilized;goal setting facilitated;positive reinforcement provided;relaxation techniques promoted;self-care encouraged;verbalization of feelings encouraged;decision-making supported       Goal: Signs and Symptoms of Listed Potential Problems Will be Absent, Minimized or Managed (Prenatal Patient, Hospitalized)  Outcome: Ongoing (interventions implemented as appropriate)   07/10/18 0604   Goal/Outcome Evaluation   Problems Assessed (Prenatal Patient) all   Problems Present (Prenatal Patient) none       Problem: Urinary Tract Infection  (Obstetrics)  Intervention: Promote/Monitor Maternal/Fetal Wellbeing   07/10/18 1709   Reproductive Interventions   Fetal Wellbeing Promotion fetal heart tones checked;intake and output monitored;uterine contraction activity assessed     Intervention: Facilitate Optimal Urinary Elimination   07/10/18 1709   Genitourinary () Interventions   Urinary Elimination Promotion frequent voiding encouraged     Intervention: Monitor/Manage Pain   07/10/18 1230 07/10/18 1453   Promote Oxygenation/Perfusion   Pain Management Interventions --  see MAR   Safety Management   Medication Review/Management medications reviewed --      Intervention: Prevent/Manage Infection Progression   07/10/18 1709   Safety  Interventions   Infection Management aseptic technique maintained     Intervention: Promote Hydration   07/10/18 1709   Nutrition Interventions   Fluid/Electrolyte Management fluids provided       Goal: Signs and Symptoms of Listed Potential Problems Will be Absent, Minimized or Managed (Urinary Tract Infection )  Outcome: Ongoing (interventions implemented as appropriate)   07/10/18 1709   Goal/Outcome Evaluation   Problems Assessed (Urinary Tract Infection) all   Problems Present (UTI) pain

## 2018-07-11 VITALS
DIASTOLIC BLOOD PRESSURE: 70 MMHG | BODY MASS INDEX: 34.04 KG/M2 | HEIGHT: 64 IN | TEMPERATURE: 98.3 F | WEIGHT: 199.4 LBS | OXYGEN SATURATION: 99 % | SYSTOLIC BLOOD PRESSURE: 124 MMHG | HEART RATE: 83 BPM | RESPIRATION RATE: 18 BRPM

## 2018-07-11 LAB — BACTERIA SPEC AEROBE CULT: ABNORMAL

## 2018-07-11 PROCEDURE — 25010000002 CEFTRIAXONE PER 250 MG: Performed by: OBSTETRICS & GYNECOLOGY

## 2018-07-11 PROCEDURE — 99238 HOSP IP/OBS DSCHRG MGMT 30/<: CPT | Performed by: OBSTETRICS & GYNECOLOGY

## 2018-07-11 RX ORDER — NITROFURANTOIN 25; 75 MG/1; MG/1
100 CAPSULE ORAL 2 TIMES DAILY
Qty: 35 CAPSULE | Refills: 6 | Status: ON HOLD | OUTPATIENT
Start: 2018-07-11 | End: 2018-10-10

## 2018-07-11 RX ORDER — CEFTRIAXONE SODIUM 1 G/50ML
1 INJECTION, SOLUTION INTRAVENOUS ONCE
Status: COMPLETED | OUTPATIENT
Start: 2018-07-11 | End: 2018-07-11

## 2018-07-11 RX ORDER — CEFTRIAXONE SODIUM 1 G/50ML
1 INJECTION, SOLUTION INTRAVENOUS ONCE
Status: DISCONTINUED | OUTPATIENT
Start: 2018-07-11 | End: 2018-07-11

## 2018-07-11 RX ADMIN — CEFTRIAXONE SODIUM 1 G: 1 INJECTION, SOLUTION INTRAVENOUS at 13:26

## 2018-07-11 RX ADMIN — SODIUM CHLORIDE 125 ML/HR: 9 INJECTION, SOLUTION INTRAVENOUS at 02:59

## 2018-07-11 NOTE — PLAN OF CARE
Problem: Patient Care Overview  Goal: Plan of Care Review  Outcome: Unable to achieve outcome(s) by discharge Date Met: 18 1240   Coping/Psychosocial   Plan of Care Reviewed With patient   Plan of Care Review   Progress improving   OTHER   Outcome Summary Denies VB, LOF, contractions, No Pain. +FM.     Goal: Discharge Needs Assessment  Outcome: Outcome(s) achieved Date Met: 18    Goal: Interprofessional Rounds/Family Conf  Outcome: Outcome(s) achieved Date Met: 18      Problem: Prenatal Patient, Hospitalized (Adult,Obstetrics,Pediatric)  Goal: Signs and Symptoms of Listed Potential Problems Will be Absent, Minimized or Managed (Prenatal Patient, Hospitalized)  Outcome: Outcome(s) achieved Date Met: 18      Problem: Urinary Tract Infection  (Obstetrics)  Goal: Signs and Symptoms of Listed Potential Problems Will be Absent, Minimized or Managed (Urinary Tract Infection )  Outcome: Unable to achieve outcome(s) by discharge Date Met: 18

## 2018-07-11 NOTE — PROGRESS NOTES
ANTEPARTUM ROUNDING NOTE     Admission date 2018     SUBJECTIVE:     Sindhu Devries is a 28 y.o.,  AT 26w4d admitted for complicated UTI.  Patient had lower abdominal pain extending to the lower back.  Her pain is now much improved since last night.  Her shows sensitivity to Rocephin and Macrobid. Denies vaginal bleeding, leakage of fluid, or contractions.  The patient notes good fetal movement.     OBJECTIVE:     Vitals:   Vitals:    07/10/18 0718 07/10/18 1230 07/10/18 2029 18 0815   BP: 110/62 123/72 122/69 124/70   BP Location:  Right arm Right arm Right arm   Patient Position:  Sitting Sitting Sitting   Pulse: 72 94 90 83   Resp:    Temp: 98 °F (36.7 °C) 98.4 °F (36.9 °C)  98.3 °F (36.8 °C)   TempSrc: Oral Oral Oral Oral   SpO2:  99% 98% 99%   Weight:       Height:             Results from last 7 days  Lab Units 18  1648   WBC 10*3/mm3 9.24   HEMOGLOBIN g/dL 10.9*   HEMATOCRIT % 32.8*   PLATELETS 10*3/mm3 249       Fetal heart rate tracing- 20 minute NST is category 1  [place]- no contractions     Culture is Escherichia coli sensitive to Rocephin and Macrobid  resistant to ampicillin, kefzol and Bactrim    EXAM:  Appearance/Psychiatric: In no distress   Constitutional: The patient is well nourished   Cardiovascular: She does not have edema.    Respiratory: Respiratory effort is normal.   Abdomen: Soft, gravid, and nontender   Ext: nontender, no edema.   Back: no CVA tenderness    ASSESSMENT AND PLAN:   Patient Active Problem List   Diagnosis   • Pregnancy   • Restless leg   • UTI (urinary tract infection)   • Abdominal pain during pregnancy, antepartum   • Complicated UTI (urinary tract infection)      Instructions pain is improved.  She will receive 1 more dose of Rocephin prior to discharge and then be discharged oral Macrobid for 7 days at a twice a day dosing.  We will continue daily at bedtime dosing until delivery as we would do for [place] nephritis.  I discussed with the  patient that since she was having some radiating pain to her flank and back we will treat her as potentially early pyelo  Patient was encouraged to push fluids without sugar or caffeine.  I encouraged water.  Patient states she drinks a lot of Sprite.

## 2018-07-11 NOTE — DISCHARGE SUMMARY
Date of Discharge:  7/11/2018    Discharge Diagnosis: 26 weeks pregnancy with complicated UTI    Presenting Problem/History of Present Illness  Pregnancy [Z34.90]  Complicated UTI (urinary tract infection) [N39.0]       Hospital Course  Patient is a 28 y.o. female presented with lower abdominal pain radiating to the low back.  Patient had a recent UTI that was responding clinically to Keflex but was resistant to the Keflex.  Patient was admitted and started on IV Rocephin which the initial culture showed sensitivity to.  Patient responded to antibiotics with improvement in her pain.  White blood cell count was normal.  Fetal heart rate tracing was reassuring.  Ultrasound was done which was also normal.  Patient is feeling much better today.  She was counseled on pushing water.  I encouraged her not to drink sugary or caffeinated drinks.    Procedures Performed         Consults:   Consults     No orders found from 6/10/2018 to 7/10/2018.          Pertinent Test Results: Urine culture shows Escherichia coli    Condition on Discharge:  Improved    Vital Signs  Temp:  [98.3 °F (36.8 °C)] 98.3 °F (36.8 °C)  Heart Rate:  [83-90] 83  Resp:  [18] 18  BP: (122-124)/(69-70) 124/70    Physical Exam:   See progress note from today.    Discharge Disposition  Home or Self Care    Discharge Medications     Discharge Medications      New Medications      Instructions Start Date   nitrofurantoin (macrocrystal-monohydrate) 100 MG capsule  Commonly known as:  MACROBID   100 mg, Oral, 2 Times Daily, BID dosing for 7 full days and then take 1 by mouth nightly at bedtime until delivery.         Continue These Medications      Instructions Start Date   ferrous sulfate 325 (65 FE) MG tablet   325 mg, Oral, Daily With Breakfast      VITAFOL ULTRA 29-0.6-0.4-200 MG capsule   200 mg, Oral, Daily             Discharge Diet:  regular, push fluids.    Activity at Discharge: ad stephen    Follow-up Appointments  Future Appointments  Date Time  Provider Department [place]   7/23/2018 10:00 AM Chilango Mccarty MD [facility] None         Test Results Pending at Discharge       Chilango Mccarty MD  07/11/18  1:09 PM

## 2018-07-11 NOTE — PLAN OF CARE
Problem: Patient Care Overview  Goal: Plan of Care Review  Outcome: Ongoing (interventions implemented as appropriate)   18   Plan of Care Review   Progress improving   OTHER   Outcome Summary Pt denies VB, LOF, ctx. No pain. +FM. Desires to go home today.       Problem: Prenatal Patient, Hospitalized (Adult,Obstetrics,Pediatric)  Goal: Signs and Symptoms of Listed Potential Problems Will be Absent, Minimized or Managed (Prenatal Patient, Hospitalized)  Outcome: Ongoing (interventions implemented as appropriate)   18   Goal/Outcome Evaluation   Problems Assessed (Prenatal Patient) all   Problems Present (Prenatal Patient) none       Problem: Urinary Tract Infection  (Obstetrics)  Goal: Signs and Symptoms of Listed Potential Problems Will be Absent, Minimized or Managed (Urinary Tract Infection )  Outcome: Ongoing (interventions implemented as appropriate)   18   Goal/Outcome Evaluation   Problems Assessed (Urinary Tract Infection) all   Problems Present (UTI) none

## 2018-07-14 ENCOUNTER — TELEPHONE (OUTPATIENT)
Dept: LABOR AND DELIVERY | Facility: HOSPITAL | Age: 28
End: 2018-07-14

## 2018-07-23 ENCOUNTER — ROUTINE PRENATAL (OUTPATIENT)
Dept: OBSTETRICS AND GYNECOLOGY | Age: 28
End: 2018-07-23

## 2018-07-23 VITALS — WEIGHT: 200 LBS | DIASTOLIC BLOOD PRESSURE: 74 MMHG | SYSTOLIC BLOOD PRESSURE: 118 MMHG | BODY MASS INDEX: 34.33 KG/M2

## 2018-07-23 DIAGNOSIS — O23.42 URINARY TRACT INFECTION IN MOTHER DURING SECOND TRIMESTER OF PREGNANCY: Primary | ICD-10-CM

## 2018-07-23 DIAGNOSIS — N39.0 COMPLICATED UTI (URINARY TRACT INFECTION): ICD-10-CM

## 2018-07-23 DIAGNOSIS — Z34.83 ENCOUNTER FOR SUPERVISION OF OTHER NORMAL PREGNANCY IN THIRD TRIMESTER: ICD-10-CM

## 2018-07-23 DIAGNOSIS — Z13.1 SCREENING FOR DIABETES MELLITUS: ICD-10-CM

## 2018-07-23 LAB
BILIRUB BLD-MCNC: NEGATIVE MG/DL
CLARITY, POC: CLEAR
COLOR UR: YELLOW
GLUCOSE UR STRIP-MCNC: NEGATIVE MG/DL
KETONES UR QL: NEGATIVE
LEUKOCYTE EST, POC: NEGATIVE
NITRITE UR-MCNC: NEGATIVE MG/ML
PH UR: 6.5 [PH] (ref 5–8)
PROT UR STRIP-MCNC: NEGATIVE MG/DL
RBC # UR STRIP: NEGATIVE /UL
SP GR UR: 1.03 (ref 1–1.03)
UROBILINOGEN UR QL: NORMAL

## 2018-07-23 PROCEDURE — 90471 IMMUNIZATION ADMIN: CPT | Performed by: OBSTETRICS & GYNECOLOGY

## 2018-07-23 PROCEDURE — 90715 TDAP VACCINE 7 YRS/> IM: CPT | Performed by: OBSTETRICS & GYNECOLOGY

## 2018-07-23 PROCEDURE — 81002 URINALYSIS NONAUTO W/O SCOPE: CPT | Performed by: OBSTETRICS & GYNECOLOGY

## 2018-07-23 PROCEDURE — 0502F SUBSEQUENT PRENATAL CARE: CPT | Performed by: OBSTETRICS & GYNECOLOGY

## 2018-07-23 NOTE — PROGRESS NOTES
Chief Complaint   Patient presents with   • Routine Prenatal Visit     HPI- Pt is 28 y.o.  at 28w2d here for prenatal visit. The patient was hospitalized with a complicated UTI.  She had some flank pain.  She required IV antibiotics.  She is feeling better but still feels some irritation and sometimes does not feel like she empties her bladder completely.  She is drinking a lot of water and taking her Macrobid at bedtime.  ROS-     - No vaginal bleeding    GI- No abdominal pain  /74   Wt 90.7 kg (200 lb)   LMP 2018 (Exact Date)   BMI 34.33 kg/m²   Exam - See flow sheet  Fetal heart rate is normal  Urine dip is negative.  No blood, protein, nitrites or leukocyte esterase.  No CVA tenderness or bladder tenderness on exam.  Fundal height at 30.  In the hospital the baby's weight was at the 77th percentile.  ultrasound was done at 26 weeks.    Assessment-  Diagnoses and all orders for this visit:    Urinary tract infection in mother during second trimester of pregnancy  -     POC Urinalysis Dipstick    Screening for diabetes mellitus  -     Gestational Screen 1 Hr ([facility])  -     CBC (No Diff)    Other orders  -     Tdap Vaccine Greater Than or Equal To 6yo IM    Patient is having some mild urinary symptoms but urine dip is negative.  We will recent culture.  She will take Macrobid twice a day until the culture is back.  She will think about to taking Macrobid at bedtime.  I encouraged her to increase her water and urinate every 2-3 hours.    Baby measured LGA in the hospital.  We will recheck fetal weight in 2 weeks.

## 2018-07-24 LAB
ERYTHROCYTE [DISTWIDTH] IN BLOOD BY AUTOMATED COUNT: 13.9 % (ref 11.7–13)
GLUCOSE 1H P 50 G GLC PO SERPL-MCNC: 119 MG/DL (ref 65–139)
HCT VFR BLD AUTO: 35.9 % (ref 35.6–45.5)
HGB BLD-MCNC: 11 G/DL (ref 11.9–15.5)
MCH RBC QN AUTO: 29.3 PG (ref 26.9–32)
MCHC RBC AUTO-ENTMCNC: 30.6 G/DL (ref 32.4–36.3)
MCV RBC AUTO: 95.7 FL (ref 80.5–98.2)
PLATELET # BLD AUTO: 205 10*3/MM3 (ref 140–500)
RBC # BLD AUTO: 3.75 10*6/MM3 (ref 3.9–5.2)
WBC # BLD AUTO: 6.89 10*3/MM3 (ref 4.5–10.7)

## 2018-07-25 LAB
BACTERIA UR CULT: NO GROWTH
BACTERIA UR CULT: NORMAL

## 2018-07-26 ENCOUNTER — TELEPHONE (OUTPATIENT)
Dept: OBSTETRICS AND GYNECOLOGY | Age: 28
End: 2018-07-26

## 2018-08-02 ENCOUNTER — PROCEDURE VISIT (OUTPATIENT)
Dept: OBSTETRICS AND GYNECOLOGY | Age: 28
End: 2018-08-02

## 2018-08-02 ENCOUNTER — ROUTINE PRENATAL (OUTPATIENT)
Dept: OBSTETRICS AND GYNECOLOGY | Age: 28
End: 2018-08-02

## 2018-08-02 VITALS — DIASTOLIC BLOOD PRESSURE: 84 MMHG | BODY MASS INDEX: 34.67 KG/M2 | WEIGHT: 202 LBS | SYSTOLIC BLOOD PRESSURE: 138 MMHG

## 2018-08-02 DIAGNOSIS — O36.62X0 EXCESSIVE FETAL GROWTH AFFECTING MANAGEMENT OF PREGNANCY IN SECOND TRIMESTER, SINGLE OR UNSPECIFIED FETUS: Primary | ICD-10-CM

## 2018-08-02 PROCEDURE — 76816 OB US FOLLOW-UP PER FETUS: CPT | Performed by: OBSTETRICS & GYNECOLOGY

## 2018-08-02 PROCEDURE — 0502F SUBSEQUENT PRENATAL CARE: CPT | Performed by: OBSTETRICS & GYNECOLOGY

## 2018-08-02 NOTE — PROGRESS NOTES
The patient is feeling well.  Her urine culture was negative.  She is here today for weight ultrasound.  Fundal height last visit was slightly high.  Patient is following the diet and her weight gain for pregnancy is been normal.  She notes good fetal movement.  She is taking her Macrobid at bedtime and taking her iron.  Her restless legs have resolved.    Ultrasound shows an estimated fetal weight of 3 lbs. 14 oz. at the 89th percentile.  GREG is 16.  Baby is vertex    Exam-see flowsheet    Assessment-29 weeks baby is measuring LGA but less than the 90th percentile.  We discussed LGA.  Patient is not a diabetic and has had 2 prior vaginal deliveries.  We did discuss diet changes to limit excessive weight gain.  Continue Macrobid.

## 2018-08-17 ENCOUNTER — ROUTINE PRENATAL (OUTPATIENT)
Dept: OBSTETRICS AND GYNECOLOGY | Age: 28
End: 2018-08-17

## 2018-08-17 VITALS — SYSTOLIC BLOOD PRESSURE: 110 MMHG | BODY MASS INDEX: 35.19 KG/M2 | DIASTOLIC BLOOD PRESSURE: 74 MMHG | WEIGHT: 205 LBS

## 2018-08-17 DIAGNOSIS — Z34.83 ENCOUNTER FOR SUPERVISION OF OTHER NORMAL PREGNANCY IN THIRD TRIMESTER: Primary | ICD-10-CM

## 2018-08-17 PROCEDURE — 0502F SUBSEQUENT PRENATAL CARE: CPT | Performed by: OBSTETRICS & GYNECOLOGY

## 2018-08-17 NOTE — PROGRESS NOTES
The patient is feeling well.  She feels active fetal movements.  She is taking her iron and Macrobid.  On last ultrasound the baby was the 89th percentile.  Patient desires induction of labor.  We did discuss possibly on October 10 at 39 weeks 4 days.    Exam-normal see flowsheet.    Assessment 31 weeks LGA    Consider induction of labor at 39 weeks 4 days.

## 2018-08-20 ENCOUNTER — TELEPHONE (OUTPATIENT)
Dept: OBSTETRICS AND GYNECOLOGY | Age: 28
End: 2018-08-20

## 2018-08-21 ENCOUNTER — ROUTINE PRENATAL (OUTPATIENT)
Dept: OBSTETRICS AND GYNECOLOGY | Age: 28
End: 2018-08-21

## 2018-08-21 ENCOUNTER — TELEPHONE (OUTPATIENT)
Dept: OBSTETRICS AND GYNECOLOGY | Age: 28
End: 2018-08-21

## 2018-08-21 ENCOUNTER — HOSPITAL ENCOUNTER (OUTPATIENT)
Facility: HOSPITAL | Age: 28
Setting detail: OBSERVATION
Discharge: HOME | End: 2018-08-21
Attending: OBSTETRICS & GYNECOLOGY | Admitting: OBSTETRICS & GYNECOLOGY

## 2018-08-21 VITALS
TEMPERATURE: 98.5 F | DIASTOLIC BLOOD PRESSURE: 71 MMHG | WEIGHT: 204 LBS | RESPIRATION RATE: 17 BRPM | HEIGHT: 64 IN | BODY MASS INDEX: 34.83 KG/M2 | SYSTOLIC BLOOD PRESSURE: 134 MMHG | HEART RATE: 80 BPM

## 2018-08-21 VITALS — WEIGHT: 204 LBS | DIASTOLIC BLOOD PRESSURE: 64 MMHG | SYSTOLIC BLOOD PRESSURE: 118 MMHG | BODY MASS INDEX: 35.02 KG/M2

## 2018-08-21 DIAGNOSIS — O26.893 ABDOMINAL PAIN IN PREGNANCY, THIRD TRIMESTER: Primary | ICD-10-CM

## 2018-08-21 DIAGNOSIS — Z3A.32 32 WEEKS GESTATION OF PREGNANCY: ICD-10-CM

## 2018-08-21 DIAGNOSIS — R10.9 ABDOMINAL PAIN IN PREGNANCY, THIRD TRIMESTER: Primary | ICD-10-CM

## 2018-08-21 LAB
BACTERIA UR QL AUTO: NORMAL /HPF
BASOPHILS # BLD AUTO: 0.02 10*3/MM3 (ref 0–0.2)
BASOPHILS NFR BLD AUTO: 0.3 % (ref 0–1.5)
BILIRUB BLD-MCNC: ABNORMAL MG/DL
BILIRUB UR QL STRIP: NEGATIVE
CLARITY UR: CLEAR
CLARITY, POC: CLEAR
COLOR UR: YELLOW
COLOR UR: YELLOW
DEPRECATED RDW RBC AUTO: 44.2 FL (ref 37–54)
EOSINOPHIL # BLD AUTO: 0.04 10*3/MM3 (ref 0–0.7)
EOSINOPHIL NFR BLD AUTO: 0.6 % (ref 0.3–6.2)
ERYTHROCYTE [DISTWIDTH] IN BLOOD BY AUTOMATED COUNT: 13.7 % (ref 11.7–13)
GLUCOSE UR STRIP-MCNC: NEGATIVE MG/DL
GLUCOSE UR STRIP-MCNC: NEGATIVE MG/DL
HCT VFR BLD AUTO: 33.5 % (ref 35.6–45.5)
HGB BLD-MCNC: 10.9 G/DL (ref 11.9–15.5)
HGB UR QL STRIP.AUTO: NEGATIVE
HYALINE CASTS UR QL AUTO: NORMAL /LPF
IMM GRANULOCYTES # BLD: 0.01 10*3/MM3 (ref 0–0.03)
IMM GRANULOCYTES NFR BLD: 0.1 % (ref 0–0.5)
KETONES UR QL STRIP: NEGATIVE
KETONES UR QL: ABNORMAL
LEUKOCYTE EST, POC: ABNORMAL
LEUKOCYTE ESTERASE UR QL STRIP.AUTO: ABNORMAL
LYMPHOCYTES # BLD AUTO: 1.48 10*3/MM3 (ref 0.9–4.8)
LYMPHOCYTES NFR BLD AUTO: 21.5 % (ref 19.6–45.3)
MCH RBC QN AUTO: 29 PG (ref 26.9–32)
MCHC RBC AUTO-ENTMCNC: 32.5 G/DL (ref 32.4–36.3)
MCV RBC AUTO: 89.1 FL (ref 80.5–98.2)
MONOCYTES # BLD AUTO: 0.4 10*3/MM3 (ref 0.2–1.2)
MONOCYTES NFR BLD AUTO: 5.8 % (ref 5–12)
NEUTROPHILS # BLD AUTO: 4.94 10*3/MM3 (ref 1.9–8.1)
NEUTROPHILS NFR BLD AUTO: 71.8 % (ref 42.7–76)
NITRITE UR QL STRIP: NEGATIVE
NITRITE UR-MCNC: NEGATIVE MG/ML
PH UR STRIP.AUTO: 7 [PH] (ref 5–8)
PH UR: 6.5 [PH] (ref 5–8)
PLATELET # BLD AUTO: 209 10*3/MM3 (ref 140–500)
PMV BLD AUTO: 10.2 FL (ref 6–12)
PROT UR QL STRIP: NEGATIVE
PROT UR STRIP-MCNC: ABNORMAL MG/DL
RBC # BLD AUTO: 3.76 10*6/MM3 (ref 3.9–5.2)
RBC # UR STRIP: NEGATIVE /UL
RBC # UR: NORMAL /HPF
REF LAB TEST METHOD: NORMAL
SP GR UR STRIP: <=1.005 (ref 1–1.03)
SP GR UR: 1.02 (ref 1–1.03)
SQUAMOUS #/AREA URNS HPF: NORMAL /HPF
UROBILINOGEN UR QL STRIP: ABNORMAL
UROBILINOGEN UR QL: ABNORMAL
WBC NRBC COR # BLD: 6.88 10*3/MM3 (ref 4.5–10.7)
WBC UR QL AUTO: NORMAL /HPF

## 2018-08-21 PROCEDURE — 81003 URINALYSIS AUTO W/O SCOPE: CPT | Performed by: PHYSICIAN ASSISTANT

## 2018-08-21 PROCEDURE — 59025 FETAL NON-STRESS TEST: CPT | Performed by: OBSTETRICS & GYNECOLOGY

## 2018-08-21 PROCEDURE — 81001 URINALYSIS AUTO W/SCOPE: CPT | Performed by: OBSTETRICS & GYNECOLOGY

## 2018-08-21 PROCEDURE — G0378 HOSPITAL OBSERVATION PER HR: HCPCS

## 2018-08-21 PROCEDURE — 99213 OFFICE O/P EST LOW 20 MIN: CPT | Performed by: PHYSICIAN ASSISTANT

## 2018-08-21 PROCEDURE — 59025 FETAL NON-STRESS TEST: CPT

## 2018-08-21 PROCEDURE — 85025 COMPLETE CBC W/AUTO DIFF WBC: CPT | Performed by: OBSTETRICS & GYNECOLOGY

## 2018-08-21 NOTE — PROGRESS NOTES
"Pregnant pt here for UTI sx's  She is 32 wks and 3 days pregnant  Noted cloudy urine on Sunday  Noted low pelvic pain/cramping yesterday  Not timeable  She denies change in vaginal secretions  Good FM  She is on macrobid daily d/t h/o a complicated UTI  She is drinking a lot of water  UA shows Leuks, neg nitrites  Temp is 99.3 and pt \"feels  Hot\"  Cervix closed/thick and high, nitrazine negative  Called report to Dr Mccarty, on call and pt sent over for further eval  "

## 2018-08-21 NOTE — NON STRESS TEST
Sindhu Devries, a  at 32w3d with an AMIRAH of 10/13/2018, by Last Menstrual Period, was seen at [facility] LABOR DELIVERY for a nonstress test.    Chief Complaint   Patient presents with   • Abdominal Cramping     32wk from office with c/o cramping and cloudy urine. txt for uti in july       Patient Active Problem List   Diagnosis   • Pregnancy   • Restless leg   • UTI (urinary tract infection)   • Abdominal pain during pregnancy, antepartum   • Complicated UTI (urinary tract infection)       Start Time: 1236  Stop Time: 9    Interpretation A  Nonstress Test Interpretation A: Reactive (18 1439 : Chen Nicole, RN)

## 2018-08-23 ENCOUNTER — TELEPHONE (OUTPATIENT)
Dept: LABOR AND DELIVERY | Facility: HOSPITAL | Age: 28
End: 2018-08-23

## 2018-08-23 LAB
BACTERIA UR CULT: NORMAL
BACTERIA UR CULT: NORMAL

## 2018-08-27 ENCOUNTER — ROUTINE PRENATAL (OUTPATIENT)
Dept: OBSTETRICS AND GYNECOLOGY | Age: 28
End: 2018-08-27

## 2018-08-27 VITALS — DIASTOLIC BLOOD PRESSURE: 80 MMHG | SYSTOLIC BLOOD PRESSURE: 122 MMHG | BODY MASS INDEX: 35.36 KG/M2 | WEIGHT: 206 LBS

## 2018-08-27 DIAGNOSIS — N39.0 COMPLICATED UTI (URINARY TRACT INFECTION): ICD-10-CM

## 2018-08-27 DIAGNOSIS — O36.60X0 EXCESSIVE FETAL GROWTH AFFECTING MANAGEMENT OF PREGNANCY, ANTEPARTUM, SINGLE OR UNSPECIFIED FETUS: Primary | ICD-10-CM

## 2018-08-27 PROCEDURE — 0502F SUBSEQUENT PRENATAL CARE: CPT | Performed by: OBSTETRICS & GYNECOLOGY

## 2018-08-27 NOTE — PROGRESS NOTES
Feeling much better - no more cramping. C/o pimples on abdomen and back - try benzyol peroxide.  GFM    Exam-fundal height is slightly elevated at 34 cm.  Heart tones are positive.  Blood pressure is normal with trace protein.    On the patient's chest there are a few red raised papular areas no pustules are seen.  They do not appear to be folliculitis or pupps.    Assessment-33 weeks.  Follow-up for LGA with repeat ultrasound in 2 weeks.  Last ultrasound showed fetal weight at the 89th percentile.  Patient desires induction of labor.  Continue nightly Macrobid due to history of Pyelo.

## 2018-09-13 ENCOUNTER — PROCEDURE VISIT (OUTPATIENT)
Dept: OBSTETRICS AND GYNECOLOGY | Age: 28
End: 2018-09-13

## 2018-09-13 ENCOUNTER — ROUTINE PRENATAL (OUTPATIENT)
Dept: OBSTETRICS AND GYNECOLOGY | Age: 28
End: 2018-09-13

## 2018-09-13 VITALS — WEIGHT: 208 LBS | SYSTOLIC BLOOD PRESSURE: 124 MMHG | BODY MASS INDEX: 35.7 KG/M2 | DIASTOLIC BLOOD PRESSURE: 80 MMHG

## 2018-09-13 DIAGNOSIS — O36.60X0 EXCESSIVE FETAL GROWTH AFFECTING MANAGEMENT OF PREGNANCY, ANTEPARTUM, SINGLE OR UNSPECIFIED FETUS: Primary | ICD-10-CM

## 2018-09-13 DIAGNOSIS — Z36.85 ANTENATAL SCREENING FOR STREPTOCOCCUS B: Primary | ICD-10-CM

## 2018-09-13 DIAGNOSIS — Z3A.35 35 WEEKS GESTATION OF PREGNANCY: ICD-10-CM

## 2018-09-13 DIAGNOSIS — O36.60X0 EXCESSIVE FETAL GROWTH AFFECTING MANAGEMENT OF PREGNANCY, ANTEPARTUM, SINGLE OR UNSPECIFIED FETUS: ICD-10-CM

## 2018-09-13 PROCEDURE — 76816 OB US FOLLOW-UP PER FETUS: CPT | Performed by: OBSTETRICS & GYNECOLOGY

## 2018-09-13 PROCEDURE — 0502F SUBSEQUENT PRENATAL CARE: CPT | Performed by: OBSTETRICS & GYNECOLOGY

## 2018-09-13 NOTE — PROGRESS NOTES
Chief Complaint   Patient presents with   • Pregnancy Ultrasound     HPI- Pt is 28 y.o.  at 35w5d here for prenatal visit. Good FM. No contraction . Rash is still present  ROS-     - No vaginal bleeding    GI- No abdominal pain  /80   Wt 94.3 kg (208 lb)   LMP 2018 (Exact Date)   BMI 35.70 kg/m²   Exam - See flow sheet  Fetal heart rate is normal  Ultrasound shows an estimated fetal weight of 7 lbs. 10 oz. at the 97th percentile.  GREG is 17.  Baby is vertex.  Assessment-  Diagnoses and all orders for this visit:     screening for streptococcus B  -     Strep B Screen - Swab, Vagina      LGA - we discussed margin of error of the ultrasound.  Patient does desire induction of labor around 39 weeks.  With her last delivery she did have Cervidil.  We will make a final decision as we repeat her cervical exams.

## 2018-09-15 LAB — GP B STREP DNA SPEC QL NAA+PROBE: NEGATIVE

## 2018-09-17 ENCOUNTER — TELEPHONE (OUTPATIENT)
Dept: OBSTETRICS AND GYNECOLOGY | Age: 28
End: 2018-09-17

## 2018-09-20 ENCOUNTER — ROUTINE PRENATAL (OUTPATIENT)
Dept: OBSTETRICS AND GYNECOLOGY | Age: 28
End: 2018-09-20

## 2018-09-20 VITALS — SYSTOLIC BLOOD PRESSURE: 112 MMHG | WEIGHT: 209 LBS | DIASTOLIC BLOOD PRESSURE: 84 MMHG | BODY MASS INDEX: 35.87 KG/M2

## 2018-09-20 DIAGNOSIS — O36.60X0 EXCESSIVE FETAL GROWTH AFFECTING MANAGEMENT OF PREGNANCY, ANTEPARTUM, SINGLE OR UNSPECIFIED FETUS: Primary | ICD-10-CM

## 2018-09-20 PROCEDURE — 0502F SUBSEQUENT PRENATAL CARE: CPT | Performed by: OBSTETRICS & GYNECOLOGY

## 2018-09-20 NOTE — PROGRESS NOTES
Patient is feeling well.  Good fetal movement noted.    Exam-see flow sheet  Cervix is 1 cm 50% and -3 station group B strep is negative    Assessment-36 weeks with LGA pregnancy  Discussed possible induction of labor on October 10  Recommend kick counts.

## 2018-09-26 ENCOUNTER — ROUTINE PRENATAL (OUTPATIENT)
Dept: OBSTETRICS AND GYNECOLOGY | Age: 28
End: 2018-09-26

## 2018-09-26 VITALS — DIASTOLIC BLOOD PRESSURE: 80 MMHG | WEIGHT: 210 LBS | BODY MASS INDEX: 36.05 KG/M2 | SYSTOLIC BLOOD PRESSURE: 122 MMHG

## 2018-09-26 DIAGNOSIS — O36.60X0 EXCESSIVE FETAL GROWTH AFFECTING MANAGEMENT OF PREGNANCY, ANTEPARTUM, SINGLE OR UNSPECIFIED FETUS: Primary | ICD-10-CM

## 2018-09-26 PROCEDURE — 0502F SUBSEQUENT PRENATAL CARE: CPT | Performed by: OBSTETRICS & GYNECOLOGY

## 2018-09-26 NOTE — PROGRESS NOTES
She and is feeling well.  She is taking her iron and Macrobid.  She feels active fetal movements.  Weight gain was 1 pound    Cervix is 2 cm 50% and -3 station.  Cervix is moderately firm.  Group B strep status is negative  Fundal height is appropriate at 37 cm.    Assessment-37 weeks with macrosomia at the 97th percentile by ultrasound.  We discussed possible induction of labor on the 10th.  Patient would like to consider induction earlier at 39 weeks but I am not on call.  We discussed Cervidil if cervix stays as the exam is today.  She will return to the office on Monday.

## 2018-10-01 ENCOUNTER — ROUTINE PRENATAL (OUTPATIENT)
Dept: OBSTETRICS AND GYNECOLOGY | Age: 28
End: 2018-10-01

## 2018-10-01 VITALS — BODY MASS INDEX: 36.12 KG/M2 | SYSTOLIC BLOOD PRESSURE: 122 MMHG | WEIGHT: 210.4 LBS | DIASTOLIC BLOOD PRESSURE: 82 MMHG

## 2018-10-01 DIAGNOSIS — Z3A.38 38 WEEKS GESTATION OF PREGNANCY: Primary | ICD-10-CM

## 2018-10-01 DIAGNOSIS — Z23 NEED FOR VACCINATION: ICD-10-CM

## 2018-10-01 PROCEDURE — 90471 IMMUNIZATION ADMIN: CPT | Performed by: OBSTETRICS & GYNECOLOGY

## 2018-10-01 PROCEDURE — 0502F SUBSEQUENT PRENATAL CARE: CPT | Performed by: OBSTETRICS & GYNECOLOGY

## 2018-10-01 PROCEDURE — 90674 CCIIV4 VAC NO PRSV 0.5 ML IM: CPT | Performed by: OBSTETRICS & GYNECOLOGY

## 2018-10-01 NOTE — PROGRESS NOTES
Patient is feeling well.  She is taking her iron and Macrobid.  She is concerned about the size of the baby.  She desires induction of labor at 39 weeks.  We have discussed possibly waiting time back in town on the 10th.  She would like to come back and on Thursday to have her cervix rechecked.    Cervix is 2 cm 50% and -3 station.  Group B strep is negative.  Fundal height is appropriate at 38 cm.    Assessment-38 weeks with macrosomia at the 97th percentile by ultrasound.  Patient would like to consider induction at 39 weeks.  She will come back in on Thursday to reassess and I will discuss with Dr. Mireles and Paco on call this weekend.

## 2018-10-04 ENCOUNTER — ROUTINE PRENATAL (OUTPATIENT)
Dept: OBSTETRICS AND GYNECOLOGY | Age: 28
End: 2018-10-04

## 2018-10-04 VITALS — DIASTOLIC BLOOD PRESSURE: 80 MMHG | WEIGHT: 212 LBS | BODY MASS INDEX: 36.39 KG/M2 | SYSTOLIC BLOOD PRESSURE: 120 MMHG

## 2018-10-04 DIAGNOSIS — O36.60X0 EXCESSIVE FETAL GROWTH AFFECTING MANAGEMENT OF PREGNANCY, ANTEPARTUM, SINGLE OR UNSPECIFIED FETUS: Primary | ICD-10-CM

## 2018-10-04 PROCEDURE — 0502F SUBSEQUENT PRENATAL CARE: CPT | Performed by: OBSTETRICS & GYNECOLOGY

## 2018-10-04 NOTE — PROGRESS NOTES
She and is here today for repeat cervical exam.  She is not felt any contractions.  Fetal movement is good and no vaginal bleeding.  No headache or visual changes.    Cervix is unchanged at 2 cm 50% and -3 station.  There is 1+ proteinuria today but normal blood pressure 120/80 and no edema.    Assessment-38 weeks 5 days with LGA.  We have discussed options for induction.  We will do an induction when I'm back in town on Wednesday.  Patient was offered induction this weekend but would like to wait till next week.  We discussed risk of LGA.  Plan Cervidil induction on Tuesday night.

## 2018-10-08 ENCOUNTER — TELEPHONE (OUTPATIENT)
Dept: OBSTETRICS AND GYNECOLOGY | Age: 28
End: 2018-10-08

## 2018-10-08 NOTE — TELEPHONE ENCOUNTER
Pt is 39 weeks and is due to del. Tomorrow but is having some contractions and cramping through the night and pt is know to have uti should she come in to have urine tested or what should she do

## 2018-10-08 NOTE — TELEPHONE ENCOUNTER
If she is concerned with contractions she should go to labor and delivery for evaluation of labor.  They will also look at her urine there.

## 2018-10-09 ENCOUNTER — HOSPITAL ENCOUNTER (INPATIENT)
Facility: HOSPITAL | Age: 28
LOS: 3 days | Discharge: HOME | End: 2018-10-12
Attending: OBSTETRICS & GYNECOLOGY | Admitting: OBSTETRICS & GYNECOLOGY

## 2018-10-09 PROBLEM — Z34.90 ENCOUNTER FOR INDUCTION OF LABOR: Status: ACTIVE | Noted: 2018-10-09

## 2018-10-09 PROBLEM — D64.9 ANEMIA: Status: ACTIVE | Noted: 2018-10-09

## 2018-10-09 LAB
ABO GROUP BLD: NORMAL
BASOPHILS # BLD AUTO: 0.02 10*3/MM3 (ref 0–0.2)
BASOPHILS NFR BLD AUTO: 0.2 % (ref 0–1.5)
BLD GP AB SCN SERPL QL: NEGATIVE
DEPRECATED RDW RBC AUTO: 48 FL (ref 37–54)
EOSINOPHIL # BLD AUTO: 0.04 10*3/MM3 (ref 0–0.7)
EOSINOPHIL NFR BLD AUTO: 0.5 % (ref 0.3–6.2)
ERYTHROCYTE [DISTWIDTH] IN BLOOD BY AUTOMATED COUNT: 14.7 % (ref 11.7–13)
HCT VFR BLD AUTO: 37 % (ref 35.6–45.5)
HGB BLD-MCNC: 11.5 G/DL (ref 11.9–15.5)
IMM GRANULOCYTES # BLD: 0.02 10*3/MM3 (ref 0–0.03)
IMM GRANULOCYTES NFR BLD: 0.2 % (ref 0–0.5)
LYMPHOCYTES # BLD AUTO: 1.32 10*3/MM3 (ref 0.9–4.8)
LYMPHOCYTES NFR BLD AUTO: 16.2 % (ref 19.6–45.3)
MCH RBC QN AUTO: 27.9 PG (ref 26.9–32)
MCHC RBC AUTO-ENTMCNC: 31.1 G/DL (ref 32.4–36.3)
MCV RBC AUTO: 89.8 FL (ref 80.5–98.2)
MONOCYTES # BLD AUTO: 0.77 10*3/MM3 (ref 0.2–1.2)
MONOCYTES NFR BLD AUTO: 9.4 % (ref 5–12)
NEUTROPHILS # BLD AUTO: 5.99 10*3/MM3 (ref 1.9–8.1)
NEUTROPHILS NFR BLD AUTO: 73.5 % (ref 42.7–76)
PLATELET # BLD AUTO: 184 10*3/MM3 (ref 140–500)
PMV BLD AUTO: 11.1 FL (ref 6–12)
RBC # BLD AUTO: 4.12 10*6/MM3 (ref 3.9–5.2)
RH BLD: POSITIVE
T&S EXPIRATION DATE: NORMAL
WBC NRBC COR # BLD: 8.16 10*3/MM3 (ref 4.5–10.7)

## 2018-10-09 PROCEDURE — 86901 BLOOD TYPING SEROLOGIC RH(D): CPT | Performed by: OBSTETRICS & GYNECOLOGY

## 2018-10-09 PROCEDURE — 86900 BLOOD TYPING SEROLOGIC ABO: CPT | Performed by: OBSTETRICS & GYNECOLOGY

## 2018-10-09 PROCEDURE — 3E0P7VZ INTRODUCTION OF HORMONE INTO FEMALE REPRODUCTIVE, VIA NATURAL OR ARTIFICIAL OPENING: ICD-10-PCS | Performed by: OBSTETRICS & GYNECOLOGY

## 2018-10-09 PROCEDURE — 85025 COMPLETE CBC W/AUTO DIFF WBC: CPT | Performed by: OBSTETRICS & GYNECOLOGY

## 2018-10-09 PROCEDURE — 86850 RBC ANTIBODY SCREEN: CPT | Performed by: OBSTETRICS & GYNECOLOGY

## 2018-10-09 RX ORDER — SODIUM CHLORIDE 0.9 % (FLUSH) 0.9 %
3-10 SYRINGE (ML) INJECTION AS NEEDED
Status: DISCONTINUED | OUTPATIENT
Start: 2018-10-09 | End: 2018-10-10 | Stop reason: HOSPADM

## 2018-10-09 RX ORDER — OXYTOCIN-SODIUM CHLORIDE 0.9% IV SOLN 30 UNIT/500ML 30-0.9/5 UT/ML-%
2-30 SOLUTION INTRAVENOUS
Status: DISCONTINUED | OUTPATIENT
Start: 2018-10-10 | End: 2018-10-10 | Stop reason: HOSPADM

## 2018-10-09 RX ORDER — ONDANSETRON 2 MG/ML
4 INJECTION INTRAMUSCULAR; INTRAVENOUS EVERY 6 HOURS PRN
Status: DISCONTINUED | OUTPATIENT
Start: 2018-10-09 | End: 2018-10-10 | Stop reason: HOSPADM

## 2018-10-09 RX ORDER — BUTORPHANOL TARTRATE 1 MG/ML
1 INJECTION, SOLUTION INTRAMUSCULAR; INTRAVENOUS
Status: DISCONTINUED | OUTPATIENT
Start: 2018-10-09 | End: 2018-10-10 | Stop reason: HOSPADM

## 2018-10-09 RX ORDER — ONDANSETRON 4 MG/1
4 TABLET, ORALLY DISINTEGRATING ORAL EVERY 6 HOURS PRN
Status: DISCONTINUED | OUTPATIENT
Start: 2018-10-09 | End: 2018-10-10 | Stop reason: HOSPADM

## 2018-10-09 RX ORDER — SODIUM CHLORIDE, SODIUM LACTATE, POTASSIUM CHLORIDE, CALCIUM CHLORIDE 600; 310; 30; 20 MG/100ML; MG/100ML; MG/100ML; MG/100ML
125 INJECTION, SOLUTION INTRAVENOUS CONTINUOUS
Status: DISCONTINUED | OUTPATIENT
Start: 2018-10-09 | End: 2018-10-12 | Stop reason: HOSPADM

## 2018-10-09 RX ORDER — ZOLPIDEM TARTRATE 5 MG/1
5 TABLET ORAL NIGHTLY PRN
Status: DISCONTINUED | OUTPATIENT
Start: 2018-10-09 | End: 2018-10-10 | Stop reason: HOSPADM

## 2018-10-09 RX ORDER — SODIUM CHLORIDE 0.9 % (FLUSH) 0.9 %
3 SYRINGE (ML) INJECTION EVERY 12 HOURS SCHEDULED
Status: DISCONTINUED | OUTPATIENT
Start: 2018-10-09 | End: 2018-10-10 | Stop reason: HOSPADM

## 2018-10-09 RX ORDER — TERBUTALINE SULFATE 1 MG/ML
0.25 INJECTION, SOLUTION SUBCUTANEOUS AS NEEDED
Status: DISCONTINUED | OUTPATIENT
Start: 2018-10-09 | End: 2018-10-10 | Stop reason: HOSPADM

## 2018-10-09 RX ORDER — LIDOCAINE HYDROCHLORIDE 10 MG/ML
5 INJECTION, SOLUTION EPIDURAL; INFILTRATION; INTRACAUDAL; PERINEURAL AS NEEDED
Status: DISCONTINUED | OUTPATIENT
Start: 2018-10-09 | End: 2018-10-10 | Stop reason: HOSPADM

## 2018-10-09 RX ORDER — ACETAMINOPHEN 325 MG/1
650 TABLET ORAL EVERY 4 HOURS PRN
Status: DISCONTINUED | OUTPATIENT
Start: 2018-10-09 | End: 2018-10-10 | Stop reason: HOSPADM

## 2018-10-09 RX ORDER — ONDANSETRON 4 MG/1
4 TABLET, FILM COATED ORAL EVERY 6 HOURS PRN
Status: DISCONTINUED | OUTPATIENT
Start: 2018-10-09 | End: 2018-10-10 | Stop reason: HOSPADM

## 2018-10-09 RX ADMIN — SODIUM CHLORIDE, POTASSIUM CHLORIDE, SODIUM LACTATE AND CALCIUM CHLORIDE 125 ML/HR: 600; 310; 30; 20 INJECTION, SOLUTION INTRAVENOUS at 18:45

## 2018-10-09 RX ADMIN — DINOPROSTONE 10 MG: 10 INSERT VAGINAL at 20:28

## 2018-10-09 RX ADMIN — ZOLPIDEM TARTRATE 5 MG: 5 TABLET ORAL at 22:41

## 2018-10-09 NOTE — H&P
[facility]  Obstetric History and Physical    Chief Complaint   Patient presents with   • Scheduled Induction     LGA       Subjective     Patient is a 28 y.o. female  patient of Dr. Mccarty currently at 39w3d, who presents for scheduled induction of labor.    Her prenatal care is complicated by suspected macrosomia. The overall fetal weight has been measuring greater than the 97th percentile. She has a history of vaginal delivery with 8 lb 2 oz baby, but this baby feels a little bigger to her. Her previous obstetric/gynecological history is benign.    The following portions of the patients history were reviewed and updated as appropriate: current medications, allergies, past medical history, past surgical history, past family history and problem list .       Prenatal Information: labs O+/rubella immune/hep B neg, RPR NR, HIV neg  GBS negative    NIPT low risk, female             Past OB History:       Obstetric History       T2      L2     SAB0   TAB0   Ectopic0   Molar0   Multiple0   Live Births2       # Outcome Date GA Lbr Christopher/2nd Weight Sex Delivery Anes PTL Lv   3 Current            2 Term 14 39w3d  3289 g (7 lb 4 oz) F Vag-Spont   DIVYA      Name: Diana   1 Term 13 40w6d  3685 g (8 lb 2 oz) F Vag-Spont   DIVYA      Name: Ashlyn      Obstetric Comments   No complications. Term inductions.        Past Medical History: Past Medical History:   Diagnosis Date   • Urinary tract infection    • Vaginal delivery       Past Surgical History Past Surgical History:   Procedure Laterality Date   • VAGINAL DELIVERY        Family History: Family History   Problem Relation Age of Onset   • Throat cancer Other    • Pancreatic cancer Maternal Grandmother    • Lung cancer Maternal Grandfather       Social History:  reports that she has never smoked. She has never used smokeless tobacco.   reports that she does not drink alcohol.   reports that she does not use drugs.        General ROS: she  notes occasional contractions, no LOF, normal fetal movement    Objective     Vital Signs Range for the last 24 hours  Temperature: Temp:  [98.4 °F (36.9 °C)] 98.4 °F (36.9 °C)   Temp Source: Temp src: Oral   BP: BP: (125)/(64) 125/64   Pulse: Heart Rate:  [75] 75   Respirations: Resp:  [16] 16   SPO2: SpO2:  [98 %] 98 %   O2 Amount (l/min):     O2 Devices Device (Oxygen Therapy): room air   Weight: Weight:  [96.6 kg (213 lb)] 96.6 kg (213 lb)     Physical Examination: General appearance - alert, well appearing, and in no distress  Regular, nonlabored breathing  Abdomen gravid, nontender to palpation  leopolds approximately 8 lb 4 oz  Calves nontender to palpation, trace edema      Assessment/Plan       Pregnancy    LGA (large for gestational age) fetus affecting management of mother    Encounter for induction of labor    Anemia    Assessment:  1.  Intrauterine pregnancy at 39w3d weeks gestation with reactive fetal status, recent variable deceleration. Plan to monitor prior to placement of cervidil  2.  Suspected macrosomia, EFW on 9/13 (35w5d) was  3454 grams, 97% and AC >99%.   4.  GBS status: negative  5. Anemia, CBC pending.     Plan:  1. Induction of labor with cervidil if not appreciably more dilated  2. Plan of care has been reviewed with patient and   3.  All questions have been answered.      Chari Taylor MD  08/21/2018  7:17 PM

## 2018-10-10 ENCOUNTER — ANESTHESIA (OUTPATIENT)
Dept: LABOR AND DELIVERY | Facility: HOSPITAL | Age: 28
End: 2018-10-10

## 2018-10-10 ENCOUNTER — ANESTHESIA EVENT (OUTPATIENT)
Dept: LABOR AND DELIVERY | Facility: HOSPITAL | Age: 28
End: 2018-10-10

## 2018-10-10 PROCEDURE — 0KQM0ZZ REPAIR PERINEUM MUSCLE, OPEN APPROACH: ICD-10-PCS | Performed by: OBSTETRICS & GYNECOLOGY

## 2018-10-10 PROCEDURE — 3E033VJ INTRODUCTION OF OTHER HORMONE INTO PERIPHERAL VEIN, PERCUTANEOUS APPROACH: ICD-10-PCS | Performed by: OBSTETRICS & GYNECOLOGY

## 2018-10-10 PROCEDURE — 25010000002 ONDANSETRON PER 1 MG: Performed by: ANESTHESIOLOGY

## 2018-10-10 PROCEDURE — 10907ZC DRAINAGE OF AMNIOTIC FLUID, THERAPEUTIC FROM PRODUCTS OF CONCEPTION, VIA NATURAL OR ARTIFICIAL OPENING: ICD-10-PCS | Performed by: OBSTETRICS & GYNECOLOGY

## 2018-10-10 PROCEDURE — 59400 OBSTETRICAL CARE: CPT | Performed by: OBSTETRICS & GYNECOLOGY

## 2018-10-10 PROCEDURE — C1755 CATHETER, INTRASPINAL: HCPCS | Performed by: ANESTHESIOLOGY

## 2018-10-10 RX ORDER — OXYCODONE HYDROCHLORIDE AND ACETAMINOPHEN 5; 325 MG/1; MG/1
2 TABLET ORAL EVERY 4 HOURS PRN
Status: DISCONTINUED | OUTPATIENT
Start: 2018-10-10 | End: 2018-10-12 | Stop reason: HOSPADM

## 2018-10-10 RX ORDER — OXYCODONE HYDROCHLORIDE AND ACETAMINOPHEN 5; 325 MG/1; MG/1
1 TABLET ORAL EVERY 4 HOURS PRN
Status: DISCONTINUED | OUTPATIENT
Start: 2018-10-10 | End: 2018-10-12 | Stop reason: HOSPADM

## 2018-10-10 RX ORDER — OXYTOCIN-SODIUM CHLORIDE 0.9% IV SOLN 30 UNIT/500ML 30-0.9/5 UT/ML-%
125 SOLUTION INTRAVENOUS CONTINUOUS PRN
Status: COMPLETED | OUTPATIENT
Start: 2018-10-10 | End: 2018-10-10

## 2018-10-10 RX ORDER — LIDOCAINE HYDROCHLORIDE AND EPINEPHRINE 15; 5 MG/ML; UG/ML
INJECTION, SOLUTION EPIDURAL AS NEEDED
Status: DISCONTINUED | OUTPATIENT
Start: 2018-10-10 | End: 2018-10-10 | Stop reason: SURG

## 2018-10-10 RX ORDER — ONDANSETRON 2 MG/ML
4 INJECTION INTRAMUSCULAR; INTRAVENOUS EVERY 6 HOURS PRN
Status: DISCONTINUED | OUTPATIENT
Start: 2018-10-10 | End: 2018-10-12 | Stop reason: HOSPADM

## 2018-10-10 RX ORDER — ERYTHROMYCIN 5 MG/G
OINTMENT OPHTHALMIC
Status: DISPENSED
Start: 2018-10-10 | End: 2018-10-11

## 2018-10-10 RX ORDER — DOCUSATE SODIUM 100 MG/1
100 CAPSULE, LIQUID FILLED ORAL 2 TIMES DAILY PRN
Status: DISCONTINUED | OUTPATIENT
Start: 2018-10-10 | End: 2018-10-12 | Stop reason: HOSPADM

## 2018-10-10 RX ORDER — ONDANSETRON 4 MG/1
4 TABLET, FILM COATED ORAL EVERY 6 HOURS PRN
Status: DISCONTINUED | OUTPATIENT
Start: 2018-10-10 | End: 2018-10-12 | Stop reason: HOSPADM

## 2018-10-10 RX ORDER — ONDANSETRON 4 MG/1
4 TABLET, ORALLY DISINTEGRATING ORAL EVERY 6 HOURS PRN
Status: DISCONTINUED | OUTPATIENT
Start: 2018-10-10 | End: 2018-10-12 | Stop reason: HOSPADM

## 2018-10-10 RX ORDER — DIPHENHYDRAMINE HYDROCHLORIDE 50 MG/ML
12.5 INJECTION INTRAMUSCULAR; INTRAVENOUS EVERY 8 HOURS PRN
Status: DISCONTINUED | OUTPATIENT
Start: 2018-10-10 | End: 2018-10-10 | Stop reason: HOSPADM

## 2018-10-10 RX ORDER — OXYTOCIN-SODIUM CHLORIDE 0.9% IV SOLN 30 UNIT/500ML 30-0.9/5 UT/ML-%
250 SOLUTION INTRAVENOUS CONTINUOUS
Status: ACTIVE | OUTPATIENT
Start: 2018-10-10 | End: 2018-10-10

## 2018-10-10 RX ORDER — METHYLERGONOVINE MALEATE 0.2 MG/ML
200 INJECTION INTRAVENOUS ONCE AS NEEDED
Status: DISCONTINUED | OUTPATIENT
Start: 2018-10-10 | End: 2018-10-10 | Stop reason: HOSPADM

## 2018-10-10 RX ORDER — PHYTONADIONE 1 MG/.5ML
INJECTION, EMULSION INTRAMUSCULAR; INTRAVENOUS; SUBCUTANEOUS
Status: DISPENSED
Start: 2018-10-10 | End: 2018-10-11

## 2018-10-10 RX ORDER — CARBOPROST TROMETHAMINE 250 UG/ML
250 INJECTION, SOLUTION INTRAMUSCULAR AS NEEDED
Status: DISCONTINUED | OUTPATIENT
Start: 2018-10-10 | End: 2018-10-10 | Stop reason: HOSPADM

## 2018-10-10 RX ORDER — OXYTOCIN-SODIUM CHLORIDE 0.9% IV SOLN 30 UNIT/500ML 30-0.9/5 UT/ML-%
999 SOLUTION INTRAVENOUS ONCE
Status: COMPLETED | OUTPATIENT
Start: 2018-10-10 | End: 2018-10-10

## 2018-10-10 RX ORDER — MISOPROSTOL 200 UG/1
800 TABLET ORAL AS NEEDED
Status: DISCONTINUED | OUTPATIENT
Start: 2018-10-10 | End: 2018-10-10 | Stop reason: HOSPADM

## 2018-10-10 RX ORDER — ONDANSETRON 2 MG/ML
4 INJECTION INTRAMUSCULAR; INTRAVENOUS ONCE AS NEEDED
Status: COMPLETED | OUTPATIENT
Start: 2018-10-10 | End: 2018-10-10

## 2018-10-10 RX ORDER — BISACODYL 10 MG
10 SUPPOSITORY, RECTAL RECTAL DAILY PRN
Status: DISCONTINUED | OUTPATIENT
Start: 2018-10-11 | End: 2018-10-12 | Stop reason: HOSPADM

## 2018-10-10 RX ORDER — EPHEDRINE SULFATE 50 MG/ML
5 INJECTION, SOLUTION INTRAVENOUS AS NEEDED
Status: DISCONTINUED | OUTPATIENT
Start: 2018-10-10 | End: 2018-10-10 | Stop reason: HOSPADM

## 2018-10-10 RX ORDER — ACETAMINOPHEN 325 MG/1
650 TABLET ORAL EVERY 4 HOURS PRN
Status: DISCONTINUED | OUTPATIENT
Start: 2018-10-10 | End: 2018-10-12 | Stop reason: HOSPADM

## 2018-10-10 RX ORDER — FAMOTIDINE 10 MG/ML
20 INJECTION, SOLUTION INTRAVENOUS ONCE AS NEEDED
Status: DISCONTINUED | OUTPATIENT
Start: 2018-10-10 | End: 2018-10-10 | Stop reason: HOSPADM

## 2018-10-10 RX ORDER — IBUPROFEN 600 MG/1
600 TABLET ORAL EVERY 8 HOURS PRN
Status: DISCONTINUED | OUTPATIENT
Start: 2018-10-10 | End: 2018-10-12 | Stop reason: HOSPADM

## 2018-10-10 RX ADMIN — Medication 96 ML/HR: at 11:46

## 2018-10-10 RX ADMIN — OXYCODONE HYDROCHLORIDE AND ACETAMINOPHEN 1 TABLET: 5; 325 TABLET ORAL at 20:36

## 2018-10-10 RX ADMIN — IBUPROFEN 600 MG: 600 TABLET ORAL at 18:54

## 2018-10-10 RX ADMIN — ONDANSETRON 4 MG: 2 INJECTION INTRAMUSCULAR; INTRAVENOUS at 15:53

## 2018-10-10 RX ADMIN — OXYTOCIN 125 ML/HR: 10 INJECTION, SOLUTION INTRAMUSCULAR; INTRAVENOUS at 17:53

## 2018-10-10 RX ADMIN — Medication: at 20:30

## 2018-10-10 RX ADMIN — ACETAMINOPHEN 650 MG: 325 TABLET, FILM COATED ORAL at 03:54

## 2018-10-10 RX ADMIN — SODIUM CHLORIDE, POTASSIUM CHLORIDE, SODIUM LACTATE AND CALCIUM CHLORIDE 125 ML/HR: 600; 310; 30; 20 INJECTION, SOLUTION INTRAVENOUS at 10:37

## 2018-10-10 RX ADMIN — LIDOCAINE HYDROCHLORIDE AND EPINEPHRINE 4 ML: 15; 5 INJECTION, SOLUTION EPIDURAL at 11:45

## 2018-10-10 RX ADMIN — SODIUM CHLORIDE, POTASSIUM CHLORIDE, SODIUM LACTATE AND CALCIUM CHLORIDE 125 ML/HR: 600; 310; 30; 20 INJECTION, SOLUTION INTRAVENOUS at 14:51

## 2018-10-10 RX ADMIN — OXYTOCIN 999 ML/HR: 10 INJECTION, SOLUTION INTRAMUSCULAR; INTRAVENOUS at 16:24

## 2018-10-10 RX ADMIN — EPHEDRINE SULFATE 5 MG: 50 INJECTION INTRAVENOUS at 12:22

## 2018-10-10 RX ADMIN — SODIUM CHLORIDE, POTASSIUM CHLORIDE, SODIUM LACTATE AND CALCIUM CHLORIDE 125 ML/HR: 600; 310; 30; 20 INJECTION, SOLUTION INTRAVENOUS at 02:29

## 2018-10-10 RX ADMIN — OXYTOCIN 250 ML/HR: 10 INJECTION, SOLUTION INTRAMUSCULAR; INTRAVENOUS at 16:40

## 2018-10-10 RX ADMIN — OXYTOCIN 2 MILLI-UNITS/MIN: 10 INJECTION, SOLUTION INTRAMUSCULAR; INTRAVENOUS at 09:21

## 2018-10-10 NOTE — ANESTHESIA PROCEDURE NOTES
Labor Epidural      Patient location during procedure: OB  Performed By  Anesthesiologist: JOSE THEODORE  Preanesthetic Checklist  Completed: patient identified, site marked, surgical consent, pre-op evaluation, timeout performed, IV checked, risks and benefits discussed and monitors and equipment checked  Prep:  Pt Position:sitting  Sterile Tech:gloves, mask and sterile barrier  Prep:chlorhexidine gluconate and isopropyl alcohol  Monitoring:blood pressure monitoring, continuous pulse oximetry and EKG  Epidural Block Procedure:  Approach:midline  Guidance:palpation technique  Location:L2-L3  Needle Type:Tuohy  Needle Gauge:17  Loss of Resistance Medium: saline  Loss of Resistance: 6cm  Cath Depth at skin:13 cm  Paresthesia: none  Aspiration:negative  Test Dose:negative  Number of Attempts: 1  Post Assessment:  Dressing:occlusive dressing applied and secured with tape  Pt Tolerance:patient tolerated the procedure well with no apparent complications

## 2018-10-10 NOTE — PLAN OF CARE
Problem: Patient Care Overview  Goal: Plan of Care Review  Outcome: Ongoing (interventions implemented as appropriate)   10/10/18 1711   Coping/Psychosocial   Plan of Care Reviewed With patient;spouse   Plan of Care Review   Progress improving   OTHER   Outcome Summary Patient had a vaginal delivery. Scant bleeding. 0/10 pain     Goal: Individualization and Mutuality  Outcome: Ongoing (interventions implemented as appropriate)   10/09/18 2231 10/10/18 0529   Individualization   Patient Specific Preferences epidural, mirror for delivery, dad does not want to cut cord --    Patient Specific Goals (Include Timeframe) kangaroo care, delayed cord clamping --    Patient Specific Interventions --  Pain medication PRN, Ambien for sleep, position changes per patient   Mutuality/Individual Preferences   What Anxieties, Fears, Concerns, or Questions Do You Have About Your Care? --  Pain management   What Information Would Help Us Give You More Personalized Care? --  none   How Would You and/or Your Support Person Like to Participate in Your Care? --  Remain active and informed in care    Mutuality/Individual Preferences   How to Address Anxieties/Fears --  Discuss plan of care     Goal: Discharge Needs Assessment  Outcome: Ongoing (interventions implemented as appropriate)   10/09/18 1841 10/10/18 0529   Discharge Needs Assessment   Readmission Within the Last 30 Days --  no previous admission in last 30 days   Concerns to be Addressed --  no discharge needs identified   Patient/Family Anticipates Transition to --  home with family   Patient/Family Anticipated Services at Transition --  none   Transportation Concerns --  car, none   Transportation Anticipated --  family or friend will provide   Anticipated Changes Related to Illness --  none   Equipment Needed After Discharge --  none   Offered/Gave Vendor List no --    Disability   Equipment Currently Used at Home --  none       Problem: Labor (Cervical Ripen, Induct,  Augment) (Adult,Obstetrics,Pediatric)  Goal: Signs and Symptoms of Listed Potential Problems Will be Absent, Minimized or Managed (Labor)  Outcome: Ongoing (interventions implemented as appropriate)   10/10/18 0529   Goal/Outcome Evaluation   Problems Assessed (Labor) all   Problems Present (Labor) none       Problem: Fall Risk,  (Adult,Obstetrics,Pediatric)  Goal: Identify Related Risk Factors and Signs and Symptoms  Outcome: Ongoing (interventions implemented as appropriate)   10/10/18 1711   Fall Risk,  (Adult,Obstetrics,Pediatric)   Related Risk Factors (Fall Risk, ) balance change;medication side effects;prolonged bed rest   Signs and Symptoms (Fall Risk, ) increased risk of  fall/drop;presence of fall risk factors     Goal: Absence of Maternal Fall  Outcome: Ongoing (interventions implemented as appropriate)   10/10/18 1711   Fall Risk,  (Adult,Obstetrics,Pediatric)   Absence of Maternal Fall making progress toward outcome     Goal: Absence of [place] Fall/Drop  Outcome: Ongoing (interventions implemented as appropriate)   10/10/18 1711   Fall Risk,  (Adult,Obstetrics,Pediatric)   Absence of [place] Fall/Drop making progress toward outcome

## 2018-10-10 NOTE — PLAN OF CARE
Problem: Patient Care Overview  Goal: Plan of Care Review  Outcome: Ongoing (interventions implemented as appropriate)   10/10/18 0529   Coping/Psychosocial   Plan of Care Reviewed With patient;spouse   Plan of Care Review   Progress no change   OTHER   Outcome Summary Continue induction of labor. Cervidil in place until 0828.     Goal: Individualization and Mutuality  Outcome: Ongoing (interventions implemented as appropriate)   10/09/18 2231 10/10/18 0529   Individualization   Patient Specific Preferences epidural, mirror for delivery, dad does not want to cut cord --    Patient Specific Goals (Include Timeframe) kangaroo care, delayed cord clamping --    Patient Specific Interventions --  Pain medication PRN, Ambien for sleep, position changes per patient   Mutuality/Individual Preferences   What Anxieties, Fears, Concerns, or Questions Do You Have About Your Care? --  Pain management   What Information Would Help Us Give You More Personalized Care? --  none   How Would You and/or Your Support Person Like to Participate in Your Care? --  Remain active and informed in care    Mutuality/Individual Preferences   How to Address Anxieties/Fears --  Discuss plan of care     Goal: Discharge Needs Assessment  Outcome: Ongoing (interventions implemented as appropriate)   10/10/18 0529   Discharge Needs Assessment   Readmission Within the Last 30 Days no previous admission in last 30 days   Concerns to be Addressed no discharge needs identified   Patient/Family Anticipates Transition to home with family   Patient/Family Anticipated Services at Transition none   Transportation Concerns car, none   Transportation Anticipated family or friend will provide   Anticipated Changes Related to Illness none   Equipment Needed After Discharge none   Disability   Equipment Currently Used at Home none      10/10/18 0529   Discharge Needs Assessment   Readmission Within the Last 30 Days no previous admission in last 30 days   Concerns  to be Addressed no discharge needs identified   Patient/Family Anticipates Transition to home with family   Patient/Family Anticipated Services at Transition none   Transportation Concerns car, none   Transportation Anticipated family or friend will provide   Anticipated Changes Related to Illness none   Equipment Needed After Discharge none   Disability   Equipment Currently Used at Home none     Goal: Interprofessional Rounds/Family Conf  Outcome: Ongoing (interventions implemented as appropriate)   10/10/18 0529   Interdisciplinary Rounds/Family Conf   Participants nursing;family;patient;physician       Problem: Labor (Cervical Ripen, Induct, Augment) (Adult,Obstetrics,Pediatric)  Goal: Signs and Symptoms of Listed Potential Problems Will be Absent, Minimized or Managed (Labor)  Outcome: Ongoing (interventions implemented as appropriate)   10/10/18 0529   Goal/Outcome Evaluation   Problems Assessed (Labor) all   Problems Present (Labor) none

## 2018-10-10 NOTE — PLAN OF CARE
Problem: Patient Care Overview  Goal: Discharge Needs Assessment  Outcome: Ongoing (interventions implemented as appropriate)    Goal: Interprofessional Rounds/Family Conf  Outcome: Ongoing (interventions implemented as appropriate)   10/09/18 2226   Interdisciplinary Rounds/Family Conf   Summary arrived for scheduled IOL w/cervical ripening. Cervidil in place, occasional mild cramping or uterine tightening, not all uterine activity per EFM is patient feeling. FHR stable after deceleration noted just after admission -reviewed with MD by previous/admitting nurse. No further decelerations noted.   Participants nursing;patient;physician       Problem: Labor (Cervical Ripen, Induct, Augment) (Adult,Obstetrics,Pediatric)  Goal: Signs and Symptoms of Listed Potential Problems Will be Absent, Minimized or Managed (Labor)  Outcome: Ongoing (interventions implemented as appropriate)   10/09/18 2226   Goal/Outcome Evaluation   Problems Assessed (Labor) all   Problems Present (Labor) none

## 2018-10-10 NOTE — PROGRESS NOTES
"The patient is comfortable.  She is not feeling painful contractions yet.  Cervidil has been in for about 12 hours.    /62   Pulse 107   Temp 98.3 °F (36.8 °C) (Oral)   Resp 16   Ht 162.6 cm (64\")   Wt 96.7 kg (213 lb 3.2 oz)   LMP 2018 (Exact Date)   SpO2 98%   Breastfeeding? Yes   BMI 36.60 kg/m²   Fetal heart rate tracing is category 1.  Contractions are mild and irregular every 2-5 minutes  Cervix is 3 cm 70% and -2 station  Artificial rupture of membranes after Cervidil was removed reveals clear fluid.    [id number]  3 para  at 39-4/7 weeks with macrosomia by ultrasound  Group B strep is negative    Plan-start Pitocin.  Epidural when needed.  "

## 2018-10-10 NOTE — L&D DELIVERY NOTE
[facility]  Vaginal Delivery Note    Delivery     Delivery: Vaginal, Spontaneous Delivery     YOB: 2016    Time of Birth: 4:19 PM      Anesthesia: Epidural     Delivering clinician: Chilango Mccarty    Forceps?   No   Vacuum? No    Shoulder dystocia present: Yes Shoulder Dystocia Details  Dystocia Present? Yes   Time head delivered: 10/10/2018  4:18 PM   Gentle attempt at traction, assisted by maternal expulsive forces:   yes  If no, why:     Anterior shoulder:     Time recognized:       Time help called:    Called by:      Time provider arrived:    Provider who arrived:      Time NICU arrived:    NICU staff:      Time additional staff arrived:    Additional staff:             Delivery narrative:  The patient is a 28-year-old  3 para  at 39-4/7 weeks who was admitted for induction of labor due to large for gestational age.  The patient received Cervidil cervical ripening last night.  This morning her cervix was 3 cm dilated.  Amniotomy revealed clear fluid.  Patient was started on Pitocin.  Her group B strep status was negative.  Patient received her epidural around noon.  She made good progress in labor.  At about 3:30 PM patient was feeling urge to push and held pressure.  She was prepped for delivery.  The fetal head was at a 0 station and completely dilated.  Patient pushed with excellent effort.  The head gradually came down and rotated to OA from transverse.  The fetal head delivered and the nares and mouth were bulb suctioned.  The fetal head delivered but the shoulders were not delivering consistent with dystocia.  The patient's legs were moved back into Adela position and suprapubic pressure was called for.  Right shoulder was anterior and did not deliver with these maneuvers so the posterior left arm was swept.  With delivery of the posterior arm the right shoulder was still not delivering.  The anterior right shoulder was gently swept counterclockwise and then delivered under  the pubic bone.  Baby was then placed on mom's abdomen and the cord was clamped and cut.  Baby was taken to the warmer for evaluation.  Baby was vigorous moving both upper extremities.  Cord blood was collected and placenta was delivered and noted to be complete.  Uterine expiration revealed no remaining tissue.  Second-degree laceration was repaired in standard fashion.  Ruelas catheter was sterilely placed.  Uterus was rechecked and noted to be firm.  On leaving the delivery room both mom and baby are stable.  Baby is in Kangaroo care with mom.    Infant    Findings: female  infant     Infant observations: Weight: 3914 g (8 lb 10.1 oz)   Length: 20.5  in  Observations/Comments:  scale 2      Apgars: 8   @ 1 minute /    9   @ 5 minutes   Infant Name: Rebeca     Placenta, Cord, and Fluid    Placenta delivered  Expressed  at   10/10/2018  4:24 PM     Cord: 3 vessels  present.   Nuchal Cord?  no   Cord blood obtained: Yes    Cord gases obtained:  No    Cord gas results: Venous:  No results found for: PHCVEN    Arterial:  No results found for: PHCART     Repair    Episiotomy: None    Lacerations: Yes  Laceration Information  Laceration Repaired?   Perineal: 2nd  Yes    Periurethral:         Labial:         Sulcus:         Vaginal: No       Cervical: No          Suture used for repair: 3-0 Vicryl   Estimated Blood Loss:             Complications  Shoulder dystocia lasting about 20-30 seconds.    Disposition  Mother to Mother Baby/Postpartum  in stable condition currently.  Baby to remains with mom  in stable condition currently.      Chilango Mccarty MD  10/10/18  4:36 PM

## 2018-10-10 NOTE — ANESTHESIA PREPROCEDURE EVALUATION
Anesthesia Evaluation     no history of anesthetic complications:               Airway   Mallampati: II  TM distance: >3 FB  Neck ROM: full  No difficulty expected  Dental      Pulmonary - normal exam   (-) not a smoker  Cardiovascular - normal exam    (-) angina      Neuro/Psych  GI/Hepatic/Renal/Endo      Musculoskeletal     Abdominal    Substance History      OB/GYN    (+) Pregnant,         Other        (-) blood dyscrasia                  Anesthesia Plan    ASA 2     epidural     Anesthetic plan, all risks, benefits, and alternatives have been provided, discussed and informed consent has been obtained with: patient.

## 2018-10-10 NOTE — PLAN OF CARE
Problem: Patient Care Overview  Goal: Plan of Care Review  Outcome: Ongoing (interventions implemented as appropriate)   10/09/18 2231   Coping/Psychosocial   Plan of Care Reviewed With patient;significant other   Plan of Care Review   Progress improving     Goal: Individualization and Mutuality  Outcome: Ongoing (interventions implemented as appropriate)   10/09/18 2231   Individualization   Patient Specific Preferences epidural, mirror for delivery, dad does not want to cut cord   Patient Specific Goals (Include Timeframe) kangaroo care, delayed cord clamping     Goal: Discharge Needs Assessment   10/09/18 2231   Discharge Needs Assessment   Concerns to be Addressed no discharge needs identified   Patient/Family Anticipates Transition to home   Transportation Concerns car, none   Transportation Anticipated car, drives self;family or friend will provide

## 2018-10-11 LAB
BASOPHILS # BLD AUTO: 0.02 10*3/MM3 (ref 0–0.2)
BASOPHILS NFR BLD AUTO: 0.2 % (ref 0–1.5)
DEPRECATED RDW RBC AUTO: 48.3 FL (ref 37–54)
EOSINOPHIL # BLD AUTO: 0.08 10*3/MM3 (ref 0–0.7)
EOSINOPHIL NFR BLD AUTO: 0.9 % (ref 0.3–6.2)
ERYTHROCYTE [DISTWIDTH] IN BLOOD BY AUTOMATED COUNT: 14.7 % (ref 11.7–13)
HCT VFR BLD AUTO: 35.2 % (ref 35.6–45.5)
HGB BLD-MCNC: 11.1 G/DL (ref 11.9–15.5)
IMM GRANULOCYTES # BLD: 0.02 10*3/MM3 (ref 0–0.03)
IMM GRANULOCYTES NFR BLD: 0.2 % (ref 0–0.5)
LYMPHOCYTES # BLD AUTO: 1.63 10*3/MM3 (ref 0.9–4.8)
LYMPHOCYTES NFR BLD AUTO: 18.4 % (ref 19.6–45.3)
MCH RBC QN AUTO: 28.4 PG (ref 26.9–32)
MCHC RBC AUTO-ENTMCNC: 31.5 G/DL (ref 32.4–36.3)
MCV RBC AUTO: 90 FL (ref 80.5–98.2)
MONOCYTES # BLD AUTO: 1 10*3/MM3 (ref 0.2–1.2)
MONOCYTES NFR BLD AUTO: 11.3 % (ref 5–12)
NEUTROPHILS # BLD AUTO: 6.09 10*3/MM3 (ref 1.9–8.1)
NEUTROPHILS NFR BLD AUTO: 69 % (ref 42.7–76)
PLATELET # BLD AUTO: 189 10*3/MM3 (ref 140–500)
PMV BLD AUTO: 10.9 FL (ref 6–12)
RBC # BLD AUTO: 3.91 10*6/MM3 (ref 3.9–5.2)
WBC NRBC COR # BLD: 8.84 10*3/MM3 (ref 4.5–10.7)

## 2018-10-11 PROCEDURE — 85025 COMPLETE CBC W/AUTO DIFF WBC: CPT | Performed by: OBSTETRICS & GYNECOLOGY

## 2018-10-11 RX ADMIN — OXYCODONE HYDROCHLORIDE AND ACETAMINOPHEN 1 TABLET: 5; 325 TABLET ORAL at 21:34

## 2018-10-11 RX ADMIN — OXYCODONE HYDROCHLORIDE AND ACETAMINOPHEN 2 TABLET: 5; 325 TABLET ORAL at 00:40

## 2018-10-11 RX ADMIN — IBUPROFEN 600 MG: 600 TABLET ORAL at 04:29

## 2018-10-11 RX ADMIN — OXYCODONE HYDROCHLORIDE AND ACETAMINOPHEN 2 TABLET: 5; 325 TABLET ORAL at 09:09

## 2018-10-11 RX ADMIN — IBUPROFEN 600 MG: 600 TABLET ORAL at 15:13

## 2018-10-11 RX ADMIN — DOCUSATE SODIUM 100 MG: 100 CAPSULE, LIQUID FILLED ORAL at 09:09

## 2018-10-11 NOTE — PROGRESS NOTES
Postpartum  Day#1    Subjective:    Chief Complaint   Patient presents with   • Scheduled Induction     LGA     Pt doing well. Pain well controlled. Lochia normal. Ambulating well. Tolerating regular diet. Voiding without difficulty. No complaints    Vitals:   LASTWT(3)@  Temp Readings from Last 3 Encounters:   10/11/18 97.9 °F (36.6 °C) (Oral)   08/21/18 98.5 °F (36.9 °C) (Oral)   07/11/18 98.3 °F (36.8 °C) (Oral)     BP Readings from Last 3 Encounters:   10/11/18 119/75   10/04/18 120/80   10/01/18 122/82     Pulse Readings from Last 3 Encounters:   10/11/18 86   08/21/18 80   07/11/18 83   ROS:      ROS:Pulm: neg for soa  GI: neg for heavy bleeding              Musculoskel: neg for leg pain        LABS:  Lab Results (last 24 hours)     Procedure Component Value Units Date/Time    CBC & Differential [[id number]] Collected:  10/11/18 0427    Specimen:  Blood Updated:  10/11/18 0505    Narrative:       The following orders were created for panel order CBC & Differential.  Procedure                               Abnormality         Status                     ---------                               -----------         ------                     CBC Auto Differential[[id number]]        Abnormal            Final result                 Please view results for these tests on the individual orders.    CBC Auto Differential [[id number]]  (Abnormal) Collected:  10/11/18 0427    Specimen:  Blood Updated:  10/11/18 0505     WBC 8.84 10*3/mm3      RBC 3.91 10*6/mm3      Hemoglobin 11.1 (L) g/dL      Hematocrit 35.2 (L) %      MCV 90.0 fL      MCH 28.4 pg      MCHC 31.5 (L) g/dL      RDW 14.7 (H) %      RDW-SD 48.3 fl      MPV 10.9 fL      Platelets 189 10*3/mm3      Neutrophil % 69.0 %      Lymphocyte % 18.4 (L) %      Monocyte % 11.3 %      Eosinophil % 0.9 %      Basophil % 0.2 %      Immature Grans % 0.2 %      Neutrophils, Absolute 6.09 10*3/mm3      Lymphocytes, Absolute 1.63 10*3/mm3      Monocytes,  Absolute 1.00 10*3/mm3      Eosinophils, Absolute 0.08 10*3/mm3      Basophils, Absolute 0.02 10*3/mm3      Immature Grans, Absolute 0.02 10*3/mm3           Exam:   Gen: NAD, cooperative, conversive  Abd: Soft, nondistended, fundus is firm below umbillicus, approp tender  Ext: Nontender bilaterally  Lochia normal        Assessment::   Sindhu Devries is a 28 y.o. female  s/p Vaginal, Spontaneous Delivery   Principal Problem:    LGA (large for gestational age) fetus affecting management of mother  Overview:  Active Problems:    Pregnancy  Overview:    Encounter for induction of labor  Overview:    Anemia  Overview:  Resolved Problems:    * No resolved hospital problems. *      1. PPD#1 , Doing well,   2. Precautions given  3. Routine post partum  care discussed  4. Ambulation encouraged.         roderick Wolfe MD     2018 9:19 AM

## 2018-10-11 NOTE — PLAN OF CARE
Problem: Patient Care Overview  Goal: Plan of Care Review  Outcome: Ongoing (interventions implemented as appropriate)   10/11/18 0140   Coping/Psychosocial   Plan of Care Reviewed With patient   Plan of Care Review   Progress improving     Goal: Individualization and Mutuality  Outcome: Ongoing (interventions implemented as appropriate)    Goal: Discharge Needs Assessment  Outcome: Ongoing (interventions implemented as appropriate)   10/09/18 1841 10/10/18 0529   Discharge Needs Assessment   Readmission Within the Last 30 Days --  no previous admission in last 30 days   Concerns to be Addressed --  no discharge needs identified   Patient/Family Anticipates Transition to --  home with family   Patient/Family Anticipated Services at Transition --  none   Transportation Concerns --  car, none   Transportation Anticipated --  family or friend will provide   Anticipated Changes Related to Illness --  none   Equipment Needed After Discharge --  none   Offered/Gave Vendor List no --    Disability   Equipment Currently Used at Home --  none       Problem: Postpartum (Vaginal Delivery) (Adult,Obstetrics,Pediatric)  Goal: Signs and Symptoms of Listed Potential Problems Will be Absent, Minimized or Managed (Postpartum)  Outcome: Ongoing (interventions implemented as appropriate)   10/11/18 0140   Goal/Outcome Evaluation   Problems Assessed (Postpartum Vaginal Delivery) all   Problems Present (Postpartum Vag Deliv) none       Problem: Breastfeeding (Adult,Obstetrics,Pediatric)  Goal: Signs and Symptoms of Listed Potential Problems Will be Absent, Minimized or Managed (Breastfeeding)  Outcome: Ongoing (interventions implemented as appropriate)   10/11/18 0140   Goal/Outcome Evaluation   Problems Assessed (Breastfeeding) all   Problems Present (Breastfeeding) none

## 2018-10-11 NOTE — LACTATION NOTE
Mom reports baby has been nursing well but sleepy this a.m. She reports she has attempted this morning and will wake baby again soon and latch. Mom denies questions. Encouraged to call if needing assistance.  Lactation Consult Note    Evaluation Completed: 10/11/2018 10:25 AM  Patient Name: Sindhu Devries  :  1990  MRN:  [id number]     REFERRAL  INFORMATION:                                         DELIVERY HISTORY:          Skin to skin initiation date/time: 10/10/2018  4:29 PM   Skin to skin end date/time:              MATERNAL ASSESSMENT:                               INFANT ASSESSMENT:  Information for the patient's :  Jaycob Stacia [[id number]]   No past medical history on file.                                                                                                                                MATERNAL INFANT FEEDING:                                                                       EQUIPMENT TYPE:                                 BREAST PUMPING:          LACTATION REFERRALS:

## 2018-10-12 VITALS
HEIGHT: 64 IN | DIASTOLIC BLOOD PRESSURE: 75 MMHG | TEMPERATURE: 97.1 F | WEIGHT: 213.2 LBS | HEART RATE: 72 BPM | BODY MASS INDEX: 36.4 KG/M2 | RESPIRATION RATE: 16 BRPM | SYSTOLIC BLOOD PRESSURE: 114 MMHG | OXYGEN SATURATION: 98 %

## 2018-10-12 PROBLEM — Z34.90 PREGNANCY: Status: RESOLVED | Noted: 2018-03-12 | Resolved: 2018-10-12

## 2018-10-12 PROBLEM — O36.60X0 LGA (LARGE FOR GESTATIONAL AGE) FETUS AFFECTING MANAGEMENT OF MOTHER: Status: RESOLVED | Noted: 2018-08-27 | Resolved: 2018-10-12

## 2018-10-12 PROBLEM — Z34.90 ENCOUNTER FOR INDUCTION OF LABOR: Status: RESOLVED | Noted: 2018-10-09 | Resolved: 2018-10-12

## 2018-10-12 RX ORDER — IBUPROFEN 600 MG/1
600 TABLET ORAL EVERY 6 HOURS PRN
Qty: 60 TABLET | Refills: 2 | Status: SHIPPED | OUTPATIENT
Start: 2018-10-12 | End: 2018-11-26

## 2018-10-12 RX ORDER — PSEUDOEPHEDRINE HCL 30 MG
100 TABLET ORAL 2 TIMES DAILY PRN
Qty: 60 CAPSULE | Refills: 2 | Status: SHIPPED | OUTPATIENT
Start: 2018-10-12 | End: 2018-11-26

## 2018-10-12 RX ORDER — OXYCODONE HYDROCHLORIDE AND ACETAMINOPHEN 5; 325 MG/1; MG/1
1 TABLET ORAL EVERY 6 HOURS PRN
Qty: 6 TABLET | Refills: 0 | Status: SHIPPED | OUTPATIENT
Start: 2018-10-12 | End: 2018-10-20

## 2018-10-12 RX ORDER — LANOLIN
CREAM (ML) TOPICAL AS NEEDED
Status: DISCONTINUED | OUTPATIENT
Start: 2018-10-12 | End: 2018-10-12 | Stop reason: HOSPADM

## 2018-10-12 RX ADMIN — OXYCODONE HYDROCHLORIDE AND ACETAMINOPHEN 1 TABLET: 5; 325 TABLET ORAL at 13:55

## 2018-10-12 RX ADMIN — IBUPROFEN 600 MG: 600 TABLET ORAL at 08:41

## 2018-10-12 RX ADMIN — OXYCODONE HYDROCHLORIDE AND ACETAMINOPHEN 1 TABLET: 5; 325 TABLET ORAL at 07:04

## 2018-10-12 RX ADMIN — DOCUSATE SODIUM 100 MG: 100 CAPSULE, LIQUID FILLED ORAL at 08:41

## 2018-10-12 RX ADMIN — IBUPROFEN 600 MG: 600 TABLET ORAL at 00:07

## 2018-10-12 RX ADMIN — Medication: at 04:04

## 2018-10-12 RX ADMIN — OXYCODONE HYDROCHLORIDE AND ACETAMINOPHEN 1 TABLET: 5; 325 TABLET ORAL at 02:31

## 2018-10-12 NOTE — PLAN OF CARE
Problem: Patient Care Overview  Goal: Plan of Care Review  Outcome: Ongoing (interventions implemented as appropriate)   10/12/18 0021   Coping/Psychosocial   Plan of Care Reviewed With patient;spouse   Plan of Care Review   Progress improving   OTHER   Outcome Summary assessment wnl; VSS; breastfeeding; pain meds prn; plan for discharge in am     Goal: Individualization and Mutuality  Outcome: Ongoing (interventions implemented as appropriate)    Goal: Discharge Needs Assessment  Outcome: Ongoing (interventions implemented as appropriate)   10/09/18 1841 10/10/18 0529   Discharge Needs Assessment   Readmission Within the Last 30 Days --  no previous admission in last 30 days   Concerns to be Addressed --  no discharge needs identified   Patient/Family Anticipates Transition to --  home with family   Patient/Family Anticipated Services at Transition --  none   Transportation Concerns --  car, none   Transportation Anticipated --  family or friend will provide   Anticipated Changes Related to Illness --  none   Equipment Needed After Discharge --  none   Offered/Gave Vendor List no --    Disability   Equipment Currently Used at Home --  none       Problem: Postpartum (Vaginal Delivery) (Adult,Obstetrics,Pediatric)  Goal: Signs and Symptoms of Listed Potential Problems Will be Absent, Minimized or Managed (Postpartum)  Outcome: Ongoing (interventions implemented as appropriate)   10/12/18 0021   Goal/Outcome Evaluation   Problems Assessed (Postpartum Vaginal Delivery) all   Problems Present (Postpartum Vag Deliv) none       Problem: Breastfeeding (Adult,Obstetrics,Pediatric)  Goal: Signs and Symptoms of Listed Potential Problems Will be Absent, Minimized or Managed (Breastfeeding)  Outcome: Ongoing (interventions implemented as appropriate)   10/12/18 0021   Goal/Outcome Evaluation   Problems Assessed (Breastfeeding) all   Problems Present (Breastfeeding) none

## 2018-10-12 NOTE — PROGRESS NOTES
10/12/2018    Name:Sindhu Devries   MR#:[id number]    Vaginal Delivery Progress Note    HD#3    Subjective   Postpartum Day 2: 28 y.o. yo Female  delivered at 39w4d  delivered a female  infant. Had dystocia with delivery relieved with delivery of posterior arm and corkscrew.    The patient feels well.  Her pain is controlled.    She ambulating well.  Patient describes her bleeding as thin lochia.    Breastfeeding: without difficulty.     Patient Active Problem List   Diagnosis   • Pregnancy   • Restless leg   • UTI (urinary tract infection)   • Abdominal pain during pregnancy, antepartum   • Complicated UTI (urinary tract infection)   • LGA (large for gestational age) fetus affecting management of mother   • Encounter for induction of labor   • Anemia   • Shoulder dystocia during labor and delivery       Objective   Vital Signs Range for the last 24 hours  Temp: Temp:  [97.1 °F (36.2 °C)-98.1 °F (36.7 °C)] 97.1 °F (36.2 °C) Temp src: Oral   BP: BP: (114-127)/(75-85) 114/75        Pulse: Heart Rate:  [62-72] 72  RR: Resp:  [16] 16  Weight: 96.7 kg (213 lb 3.2 oz)  BMI:  Body mass index is 36.6 kg/m².      Lab Results   Component Value Date    WBC 8.84 10/11/2018    HGB 11.1 (L) 10/11/2018    HCT 35.2 (L) 10/11/2018    MCV 90.0 10/11/2018     10/11/2018       Physical Exam  General:  no acute distresss.  Abdomen: abdomen is soft without significant tenderness, masses, organomegaly or guarding. Fundus: Firm with scant lochia  Extremities: no cyanosis, and trace edema, no CT    Perineum: had second degree    Assessment/Plan   1.  PPD# 2  2. Delivery complicated by shoulder dystocia requiring del of posterior arm and corkscrew maneuver. Patient aware of this complication and to inform future OBs if has other provider    Plan: DC home today, follow up in the office    Chari Taylor MD  10/12/2018 10:33 AM

## 2018-10-12 NOTE — DISCHARGE SUMMARY
Vaginal delivery Discharge Summary      Date of Admission: 10/9/2018    Date of Discharge:  10/12/2018    Patient: Sindhu Devries      MR#:[id number]    Surgeon/OB: Chilango Mccarty     Discharge Diagnosis: Vaginal Delivery at 39w4d complicated by shoulder dystocia, uncomplicated recovery    Procedures:  Vaginal, Spontaneous Delivery     10/10/2018    4:19 PM      Anesthesia:  Epidural     Presenting Problem/History of Present Illness  Pregnancy [Z34.90]  Encounter for induction of labor [Z34.90]     Patient Active Problem List   Diagnosis   • Restless leg   • UTI (urinary tract infection)   • Abdominal pain during pregnancy, antepartum   • Complicated UTI (urinary tract infection)   • Anemia   • Shoulder dystocia during labor and delivery       Hospital Course  Patient is a 28 y.o. female  at 39w4d status post vaginal delivery complicated by shoulder dystocia.  Uneventful recovery.  Patient is ambulating, tolerating a regular diet. Had second degree laceration.    Infant:   female  fetus 3914 g (8 lb 10.1 oz)  with Apgar scores of 8  , 9   at five minutes.    Condition on Discharge:  Stable    Vital Signs  Temp:  [97.1 °F (36.2 °C)-98.1 °F (36.7 °C)] 97.1 °F (36.2 °C)  Heart Rate:  [62-72] 72  Resp:  [16] 16  BP: (114-127)/(75-85) 114/75    Lab Results   Component Value Date    WBC 8.84 10/11/2018    HGB 11.1 (L) 10/11/2018    HCT 35.2 (L) 10/11/2018    MCV 90.0 10/11/2018     10/11/2018       Discharge Disposition  Home or Self Care    Discharge Medications     Discharge Medications      New Medications      Instructions Start Date   docusate sodium 100 MG capsule   100 mg, Oral, 2 Times Daily PRN      ibuprofen 600 MG tablet  Commonly known as:  ADVIL,MOTRIN   600 mg, Oral, Every 6 Hours PRN      oxyCODONE-acetaminophen 5-325 MG per tablet  Commonly known as:  PERCOCET   1 tablet, Oral, Every 6 Hours PRN         Continue These Medications      Instructions Start Date   VITAFOL ULTRA 29-0.6-0.4-200 MG  capsule   200 mg, Oral, Daily         Stop These Medications    ferrous sulfate 325 (65 FE) MG tablet            Discharge Diet: Regular    Activity at Discharge:   Activity Instructions     Discharge Activity Restrictions       1) No driving until you feel you could slam on the breaks without pain  2) May shower anytime, may bathe after two weeks    Pelvic Rest       Nothing in the vagina for six weeks          Follow-up Appointments  No future appointments.  Additional Instructions for the Follow-ups that You Need to Schedule     Call MD With Problems / Concerns    As directed      If fever, heavy bleeding, persistent headache or visual changes, breast complaints or any other issues.    Order Comments:  If fever, heavy bleeding, persistent headache or visual changes, breast complaints or any other issues.          Discharge Follow-up with Specified Provider: Follow up with your primary OB in 4-6 weeks    As directed      To:  Follow up with your primary OB in 4-6 weeks             Prenatal labs/vax: O+/I/-/- NR, + VZ history    Chari Taylor MD  10/12/18  1:07 PM

## 2018-11-26 ENCOUNTER — POSTPARTUM VISIT (OUTPATIENT)
Dept: OBSTETRICS AND GYNECOLOGY | Age: 28
End: 2018-11-26

## 2018-11-26 VITALS
WEIGHT: 183 LBS | HEIGHT: 64 IN | BODY MASS INDEX: 31.24 KG/M2 | DIASTOLIC BLOOD PRESSURE: 74 MMHG | SYSTOLIC BLOOD PRESSURE: 122 MMHG

## 2018-11-26 DIAGNOSIS — Z12.4 ROUTINE CERVICAL SMEAR: ICD-10-CM

## 2018-11-26 PROBLEM — O26.899 ABDOMINAL PAIN DURING PREGNANCY, ANTEPARTUM: Status: RESOLVED | Noted: 2018-07-09 | Resolved: 2018-11-26

## 2018-11-26 PROBLEM — R10.9 ABDOMINAL PAIN DURING PREGNANCY, ANTEPARTUM: Status: RESOLVED | Noted: 2018-07-09 | Resolved: 2018-11-26

## 2018-11-26 PROBLEM — N39.0 UTI (URINARY TRACT INFECTION): Status: RESOLVED | Noted: 2018-06-25 | Resolved: 2018-11-26

## 2018-11-26 PROBLEM — N39.0 COMPLICATED UTI (URINARY TRACT INFECTION): Status: RESOLVED | Noted: 2018-07-09 | Resolved: 2018-11-26

## 2018-11-26 PROBLEM — G25.81 RESTLESS LEG: Status: RESOLVED | Noted: 2018-05-30 | Resolved: 2018-11-26

## 2018-11-26 PROBLEM — D64.9 ANEMIA: Status: RESOLVED | Noted: 2018-10-09 | Resolved: 2018-11-26

## 2018-11-26 PROCEDURE — 0503F POSTPARTUM CARE VISIT: CPT | Performed by: OBSTETRICS & GYNECOLOGY

## 2018-11-26 RX ORDER — ACETAMINOPHEN AND CODEINE PHOSPHATE 120; 12 MG/5ML; MG/5ML
1 SOLUTION ORAL DAILY
Qty: 28 TABLET | Refills: 11 | Status: SHIPPED | OUTPATIENT
Start: 2018-11-26 | End: 2019-05-09

## 2018-11-26 NOTE — PROGRESS NOTES
"Subjective   Sindhu Devries is a 28 y.o. female who presents for a postpartum visit. She is 6 weeks postpartum following a spontaneous vaginal delivery. I have fully reviewed the prenatal and intrapartum course. Postpartum course has been uneventful. Baby is feeding by breast. Bleeding has been normal in amount and decreasing. Bowel function is normal. Bladder function is normal. Patient not sexually active at this time. Contraception method is discussed. Postpartum depression screening: negative.    The following portions of the patient's history were reviewed and updated as appropriate: allergies, current medications,and problem list.    Review of Systems  Pertinent items are noted in HPI.    Objective   /74   Ht 162.6 cm (64\")   Wt 83 kg (183 lb)   LMP 01/06/2018 (Exact Date)   Breastfeeding? Yes   BMI 31.41 kg/m²    General:  Alert and oriented, NAD    Breasts:         Heart:     Abdomen: Normal findings, nontender    Vulva: Normal, well-healed    Vagina: No lesions or abnormal discharge   Cervix:  Normal with no cervical motion tenderness   Corpus: RV NT    Adnexa:  Non tender, non enlarged         Assessment/Plan     Normal postpartum exam. Pap smear done at today's visit.    1. Contraception: Mini pill   2. Slow return to normal activities reviewed. Continue prenatal vitamins.  3. Follow up in 12 months or sooner as needed.           "

## 2018-11-28 LAB
CONV .: NORMAL
CYTOLOGIST CVX/VAG CYTO: NORMAL
CYTOLOGY CVX/VAG DOC THIN PREP: NORMAL
DX ICD CODE: NORMAL
HIV 1 & 2 AB SER-IMP: NORMAL
OTHER STN SPEC: NORMAL
PATH REPORT.FINAL DX SPEC: NORMAL
STAT OF ADQ CVX/VAG CYTO-IMP: NORMAL

## 2018-11-29 ENCOUNTER — TELEPHONE (OUTPATIENT)
Dept: OBSTETRICS AND GYNECOLOGY | Age: 28
End: 2018-11-29

## 2018-11-29 NOTE — TELEPHONE ENCOUNTER
----- Message from Chilango Mccarty MD sent at 11/29/2018  8:51 AM EST -----  Please notify pap is normal.

## 2019-05-09 ENCOUNTER — OFFICE VISIT (OUTPATIENT)
Dept: OBSTETRICS AND GYNECOLOGY | Age: 29
End: 2019-05-09

## 2019-05-09 VITALS
BODY MASS INDEX: 30.9 KG/M2 | WEIGHT: 181 LBS | DIASTOLIC BLOOD PRESSURE: 74 MMHG | SYSTOLIC BLOOD PRESSURE: 110 MMHG | HEIGHT: 64 IN

## 2019-05-09 PROCEDURE — 99213 OFFICE O/P EST LOW 20 MIN: CPT | Performed by: OBSTETRICS & GYNECOLOGY

## 2019-05-09 RX ORDER — ACETAMINOPHEN AND CODEINE PHOSPHATE 120; 12 MG/5ML; MG/5ML
1 SOLUTION ORAL DAILY
Qty: 28 TABLET | Refills: 11 | Status: SHIPPED | OUTPATIENT
Start: 2019-05-09 | End: 2019-10-14 | Stop reason: ALTCHOICE

## 2019-05-09 NOTE — PROGRESS NOTES
"  Chief complaint-complaints of postpartum depression.    History of present illness- Patient is a 29 y.o.  who complains of feeling very anxious.  She is worried that something will happen to her children.  She is having thoughts of something bad happening to them.  She denies any suicidal or homicidal thoughts.  Patient does have a history of depression in the distant past.  She did take medication at that time.  Patient is currently breast-feeding for the past 6 months and would like to continue for another 6 months.  She has not been to counseling.  Bad thought breastfeeding         /74   Ht 162.6 cm (64\")   Wt 82.1 kg (181 lb)   Breastfeeding? Yes   BMI 31.07 kg/m²   Physical Exam   Constitutional: She appears well-developed and well-nourished. No distress.   Psychiatric: She has a normal mood and affect. Her behavior is normal.           Sindhu was seen today for depression.    Diagnoses and all orders for this visit:    Postpartum depression    Other orders  -     sertraline (ZOLOFT) 50 MG tablet; Take 1 tablet by mouth Daily.  -     norethindrone (NOR-QD) 0.35 MG tablet; Take 1 tablet by mouth Daily.    We discussed postpartum depression in detail.  Patient was given referral information to [facility].    We discussed the way SSRIs work to increase his serotonin in the brain.  We did discuss that they take about 4 to 6 weeks to start working.  I also encouraged the patient to exercise and to follow a healthy diet.  We discussed that the medication does go through the breast milk but seems to be safe.    Patient would like to restart the minipill for contraception in addition to condoms.    15 minutes were spent in face-to-face consultation with the patient.  "

## 2019-06-03 ENCOUNTER — TELEPHONE (OUTPATIENT)
Dept: OBSTETRICS AND GYNECOLOGY | Age: 29
End: 2019-06-03

## 2019-06-03 NOTE — TELEPHONE ENCOUNTER
Dr Mccarty pt about 7 months pp & nursing reports an itchy rash with fine bumps on her back, abdomen and slightly on her left arm that began about a week ago. Pt hasn't used an ointment or tried anything otc yet believes it may be a side effect of recently taking Zoloft for pp depression.. Please advise.

## 2019-06-03 NOTE — TELEPHONE ENCOUNTER
Pt wants to know if there is another medication she can try for the depression instead, even though she is still breastfeeding?

## 2019-06-03 NOTE — TELEPHONE ENCOUNTER
The patient is using Benadryl cream.  She will also stop the Zoloft and see if the rash improves.  She does have the phone number to [facility] and may start that.

## 2019-06-03 NOTE — TELEPHONE ENCOUNTER
Please notify it could be a rash related to the Zoloft but typically an allergic reaction will be all over her entire body.  Patient can try Benadryl or hydrocortisone cream.  She could also see primary care.

## 2019-07-02 ENCOUNTER — CLINICAL SUPPORT (OUTPATIENT)
Dept: OBSTETRICS AND GYNECOLOGY | Age: 29
End: 2019-07-02

## 2019-07-02 ENCOUNTER — TELEPHONE (OUTPATIENT)
Dept: OBSTETRICS AND GYNECOLOGY | Age: 29
End: 2019-07-02

## 2019-07-02 DIAGNOSIS — R39.9 UTI SYMPTOMS: Primary | ICD-10-CM

## 2019-07-02 LAB
BILIRUB BLD-MCNC: NEGATIVE MG/DL
CLARITY, POC: ABNORMAL
COLOR UR: YELLOW
GLUCOSE UR STRIP-MCNC: NEGATIVE MG/DL
KETONES UR QL: NEGATIVE
LEUKOCYTE EST, POC: ABNORMAL
NITRITE UR-MCNC: NEGATIVE MG/ML
PH UR: 7 [PH] (ref 5–8)
PROT UR STRIP-MCNC: ABNORMAL MG/DL
RBC # UR STRIP: NEGATIVE /UL
SP GR UR: 1.02 (ref 1–1.03)
UROBILINOGEN UR QL: NORMAL

## 2019-07-02 PROCEDURE — 81003 URINALYSIS AUTO W/O SCOPE: CPT | Performed by: NURSE PRACTITIONER

## 2019-07-02 RX ORDER — NITROFURANTOIN 25; 75 MG/1; MG/1
100 CAPSULE ORAL 2 TIMES DAILY
Qty: 14 CAPSULE | Refills: 0 | Status: SHIPPED | OUTPATIENT
Start: 2019-07-02 | End: 2019-12-19

## 2019-07-02 NOTE — TELEPHONE ENCOUNTER
(Bib[place] pt) is having pain when she urinates along with frequency. Pt is having cloudy urine and has an odor that normally isn't there along with pressure. Pt believe it is a uti and is requesting medication but aware she may need to be seen. Pt is still breast feeding.  Pharmacy verified.     [phone]

## 2019-07-04 LAB
BACTERIA UR CULT: NO GROWTH
BACTERIA UR CULT: NORMAL

## 2019-10-14 ENCOUNTER — TELEPHONE (OUTPATIENT)
Dept: OBSTETRICS AND GYNECOLOGY | Age: 29
End: 2019-10-14

## 2019-10-14 RX ORDER — ETONOGESTREL AND ETHINYL ESTRADIOL 11.7; 2.7 MG/1; MG/1
1 INSERT, EXTENDED RELEASE VAGINAL
Qty: 1 EACH | Refills: 2 | Status: SHIPPED | OUTPATIENT
Start: 2019-10-14 | End: 2019-12-19 | Stop reason: SDUPTHER

## 2019-12-19 ENCOUNTER — OFFICE VISIT (OUTPATIENT)
Dept: OBSTETRICS AND GYNECOLOGY | Age: 29
End: 2019-12-19

## 2019-12-19 VITALS
WEIGHT: 174 LBS | BODY MASS INDEX: 29.71 KG/M2 | DIASTOLIC BLOOD PRESSURE: 78 MMHG | HEIGHT: 64 IN | SYSTOLIC BLOOD PRESSURE: 112 MMHG

## 2019-12-19 DIAGNOSIS — R30.0 BURNING WITH URINATION: Primary | ICD-10-CM

## 2019-12-19 DIAGNOSIS — Z01.419 ENCOUNTER FOR GYNECOLOGICAL EXAMINATION WITHOUT ABNORMAL FINDING: ICD-10-CM

## 2019-12-19 DIAGNOSIS — Z12.4 SCREENING FOR MALIGNANT NEOPLASM OF THE CERVIX: ICD-10-CM

## 2019-12-19 DIAGNOSIS — N30.00 ACUTE CYSTITIS WITHOUT HEMATURIA: ICD-10-CM

## 2019-12-19 DIAGNOSIS — Z11.51 SPECIAL SCREENING EXAMINATION FOR HUMAN PAPILLOMAVIRUS (HPV): ICD-10-CM

## 2019-12-19 LAB
BILIRUB BLD-MCNC: NEGATIVE MG/DL
CLARITY, POC: CLEAR
COLOR UR: YELLOW
GLUCOSE UR STRIP-MCNC: NEGATIVE MG/DL
KETONES UR QL: NEGATIVE
LEUKOCYTE EST, POC: ABNORMAL
NITRITE UR-MCNC: NEGATIVE MG/ML
PH UR: 6 [PH] (ref 5–8)
PROT UR STRIP-MCNC: NEGATIVE MG/DL
RBC # UR STRIP: NEGATIVE /UL
SP GR UR: 1.02 (ref 1–1.03)
UROBILINOGEN UR QL: NORMAL

## 2019-12-19 PROCEDURE — 99395 PREV VISIT EST AGE 18-39: CPT | Performed by: OBSTETRICS & GYNECOLOGY

## 2019-12-19 PROCEDURE — 81002 URINALYSIS NONAUTO W/O SCOPE: CPT | Performed by: OBSTETRICS & GYNECOLOGY

## 2019-12-19 RX ORDER — NITROFURANTOIN 25; 75 MG/1; MG/1
100 CAPSULE ORAL 2 TIMES DAILY
Qty: 14 CAPSULE | Refills: 0 | Status: SHIPPED | OUTPATIENT
Start: 2019-12-19 | End: 2019-12-26

## 2019-12-19 RX ORDER — FLUCONAZOLE 150 MG/1
150 TABLET ORAL ONCE
Qty: 1 TABLET | Refills: 0 | Status: SHIPPED | OUTPATIENT
Start: 2019-12-19 | End: 2019-12-19

## 2019-12-19 RX ORDER — ETONOGESTREL AND ETHINYL ESTRADIOL 11.7; 2.7 MG/1; MG/1
1 INSERT, EXTENDED RELEASE VAGINAL
Qty: 3 EACH | Refills: 3 | Status: SHIPPED | OUTPATIENT
Start: 2019-12-19 | End: 2021-01-08 | Stop reason: SDUPTHER

## 2019-12-19 NOTE — PROGRESS NOTES
Subjective     Chief Complaint   Patient presents with   • Gynecologic Exam     AC. Last pap 2018 normal.       History of Present Illness    Sindhu Devries is a 29 y.o.  who presents for annual exam.  Patient reports dysuria urgency and nocturia.  She would like to be tested for a urinary tract infection.  She is happy with the NuvaRing.  She felt like she had more yeast infections when she had the IUD.   is planning on having a vasectomy.    Her menses are regular every 28-30 days, lasting 4-7 days, dysmenorrhea mild, occurring first 1-2 days of flow   Obstetric History:  OB History        3    Para   3    Term   3            AB        Living   3       SAB        TAB        Ectopic        Molar        Multiple   0    Live Births   3          Obstetric Comments   No complications. Term inductions.             Menstrual History:     Patient's last menstrual period was 2019.         Current contraception: NuvaRing vaginal inserts  History of abnormal Pap smear: no  Received Gardasil immunization: no  Perform regular self breast exam : no  Family history of uterine or ovarian cancer: no  Family History of colon cancer: no  Family history of breast cancer: no    Mammogram: not indicated.  Colonoscopy: not indicated.  DEXA: not indicated.    Exercise: not active  Calcium/Vitamin D: inadequate intake    The following portions of the patient's history were reviewed and updated as appropriate: allergies, current medications, past family history, past medical history, past social history, past surgical history and problem list.    Review of Systems    Review of Systems   Constitutional: Negative for fatigue.   Respiratory: Negative for shortness of breath.    Gastrointestinal: Negative for abdominal pain.   Genitourinary: Negative for dysuria.   Neurological: Negative for headaches.   Psychiatric/Behavioral: Negative for dysphoric mood.         Objective   Physical Exam    /78   " Ht 162.6 cm (64\")   Wt 78.9 kg (174 lb)   LMP 11/28/2019   Breastfeeding No   BMI 29.87 kg/m²     General:   alert, appears stated age and cooperative   Neck: thyroid normal to palpation   Heart: regular rate and rhythm   Lungs: clear to auscultation bilaterally   Abdomen: soft, non-tender, without masses or organomegaly   Breast: inspection negative, no nipple discharge or bleeding, no masses or nodularity palpable   Vulva: normal, Bartholin's, Urethra, [place]'s normal   Vagina: normal mucosa, normal discharge   Cervix: no cervical motion tenderness and no lesions   Uterus: non-tender, normal shape and consistency   Adnexa: no mass, fullness, tenderness   Rectal: not indicated     Assessment/Plan   Sindhu was seen today for gynecologic exam.    Diagnoses and all orders for this visit:    Burning with urination  -     POC Urinalysis Dipstick  -     Urine Culture - Urine, Urine, Clean Catch    Acute cystitis without hematuria    Encounter for gynecological examination without abnormal finding  -     IGP,rfx Aptima HPV All Pth    Screening for malignant neoplasm of the cervix  -     IGP,rfx Aptima HPV All Pth    Special screening examination for human papillomavirus (HPV)  -     IGP,rfx Aptima HPV All Pth    Other orders  -     nitrofurantoin, macrocrystal-monohydrate, (MACROBID) 100 MG capsule; Take 1 capsule by mouth 2 (Two) Times a Day for 7 days.  -     fluconazole (DIFLUCAN) 150 MG tablet; Take 1 tablet by mouth 1 (One) Time for 1 dose.  -     etonogestrel-ethinyl estradiol (NUVARING) 0.12-0.015 MG/24HR vaginal ring; Insert 1 each into the vagina Every 28 (Twenty-Eight) Days. Insert pv for 3 wks, then remove for 1 wk      Urine dip shows moderate leukocytes.  We will start Macrobid and check urine culture.  Patient has a history of yeast infection so we will also add Diflucan.    Continue NuvaRing.  Patient is aware of risk of DVT heart attack and stroke.  Her  does plan to have a vasectomy.  All " questions answered.  Breast self exam technique reviewed and patient encouraged to perform self-exam monthly.  Discussed healthy lifestyle modifications.  Recommended 30 minutes of aerobic exercise five times per week.  Discussed calcium needs to prevent osteoporosis.

## 2019-12-23 LAB
BACTERIA UR CULT: ABNORMAL
BACTERIA UR CULT: ABNORMAL
OTHER ANTIBIOTIC SUSC ISLT: ABNORMAL

## 2019-12-24 LAB
CONV .: NORMAL
CYTOLOGIST CVX/VAG CYTO: NORMAL
CYTOLOGY CVX/VAG DOC CYTO: NORMAL
CYTOLOGY CVX/VAG DOC THIN PREP: NORMAL
DX ICD CODE: NORMAL
HIV 1 & 2 AB SER-IMP: NORMAL
Lab: NORMAL
OTHER STN SPEC: NORMAL
STAT OF ADQ CVX/VAG CYTO-IMP: NORMAL

## 2019-12-26 ENCOUNTER — TELEPHONE (OUTPATIENT)
Dept: OBSTETRICS AND GYNECOLOGY | Age: 29
End: 2019-12-26

## 2019-12-26 NOTE — TELEPHONE ENCOUNTER
Tell patient she definitely had a UTI of E. coli the typical bacteria.  Tell her the medicine she got and has been taking is proper for this infection.  Please finish the 7 days.  No need for follow-up unless she has any other symptoms

## 2019-12-26 NOTE — TELEPHONE ENCOUNTER
Dr. Mccarty  Pt called requesting test results.  ( knows Dr. LUJAN & Tea are out of office)  Results not signed off on.  No Np or Pa available in office today.       #  [phone]

## 2019-12-30 ENCOUNTER — TELEPHONE (OUTPATIENT)
Dept: OBSTETRICS AND GYNECOLOGY | Age: 29
End: 2019-12-30

## 2019-12-30 NOTE — TELEPHONE ENCOUNTER
----- Message from Chilango Mccarty MD sent at 12/28/2019  9:36 PM EST -----  Please notify pap is normal. UTI was treated.

## 2020-03-27 RX ORDER — FLUCONAZOLE 150 MG/1
150 TABLET ORAL DAILY
Qty: 1 TABLET | Refills: 0 | Status: SHIPPED | OUTPATIENT
Start: 2020-03-27 | End: 2020-11-04 | Stop reason: SDUPTHER

## 2020-03-27 NOTE — TELEPHONE ENCOUNTER
(Dr Mccarty) Pt has a yeast infection - itchiness and some irritation. Pt would like some Diflucan sent to her pharmacy on file. Pt appreciates it. Please Advise

## 2020-05-07 ENCOUNTER — TELEPHONE (OUTPATIENT)
Dept: OBSTETRICS AND GYNECOLOGY | Age: 30
End: 2020-05-07

## 2020-05-07 RX ORDER — FLUCONAZOLE 150 MG/1
150 TABLET ORAL ONCE
Qty: 1 TABLET | Refills: 0 | Status: SHIPPED | OUTPATIENT
Start: 2020-05-07 | End: 2020-05-07

## 2020-05-07 NOTE — TELEPHONE ENCOUNTER
Pt called believes she may have a yeast infection.  Pt has itching and irritation.  Requesting a Rx be called in.

## 2020-11-04 RX ORDER — FLUCONAZOLE 150 MG/1
TABLET ORAL
Qty: 1 TABLET | Refills: 0 | Status: SHIPPED | OUTPATIENT
Start: 2020-11-04 | End: 2021-03-25

## 2020-11-04 RX ORDER — FLUCONAZOLE 150 MG/1
150 TABLET ORAL DAILY
Qty: 1 TABLET | Refills: 0 | OUTPATIENT
Start: 2020-11-04

## 2021-01-08 RX ORDER — ETONOGESTREL AND ETHINYL ESTRADIOL 11.7; 2.7 MG/1; MG/1
1 INSERT, EXTENDED RELEASE VAGINAL
Qty: 3 EACH | Refills: 0 | Status: SHIPPED | OUTPATIENT
Start: 2021-01-08 | End: 2021-03-25 | Stop reason: SDUPTHER

## 2021-03-25 ENCOUNTER — OFFICE VISIT (OUTPATIENT)
Dept: OBSTETRICS AND GYNECOLOGY | Age: 31
End: 2021-03-25

## 2021-03-25 VITALS
BODY MASS INDEX: 32.27 KG/M2 | HEIGHT: 64 IN | DIASTOLIC BLOOD PRESSURE: 64 MMHG | WEIGHT: 189 LBS | SYSTOLIC BLOOD PRESSURE: 108 MMHG

## 2021-03-25 DIAGNOSIS — Z01.419 ENCOUNTER FOR GYNECOLOGICAL EXAMINATION WITHOUT ABNORMAL FINDING: Primary | ICD-10-CM

## 2021-03-25 PROCEDURE — 99395 PREV VISIT EST AGE 18-39: CPT | Performed by: OBSTETRICS & GYNECOLOGY

## 2021-03-25 RX ORDER — ETONOGESTREL AND ETHINYL ESTRADIOL 11.7; 2.7 MG/1; MG/1
1 INSERT, EXTENDED RELEASE VAGINAL
Qty: 3 EACH | Refills: 3 | Status: SHIPPED | OUTPATIENT
Start: 2021-03-25 | End: 2022-03-26 | Stop reason: SDUPTHER

## 2021-03-25 NOTE — PROGRESS NOTES
Subjective     Chief Complaint   Patient presents with   • Gynecologic Exam     AC, last pap -19 normal.        History of Present Illness    Sindhu Devries is a 31 y.o.  who presents for annual exam.  The patient had Covid in January.  She was very sick she reports but did not have to be hospitalized.  Patient's children are doing well they are ages 2 6 and 7.  Patient is happy with her NuvaRing.  She has noticed that she has heavy bleeding on the first day.  She tried an IUD in the past and tried doing the NuvaRing continuously but did not like either 1 of those so would like to continue with the NuvaRing in the regular fashion.    Her menses are regular every 28-30 days, lasting 4 days - 1 day heavy , dysmenorrhea mild, occurring first 1-2 days of flow   Obstetric History:  OB History        3    Para   3    Term   3            AB        Living   3       SAB        TAB        Ectopic        Molar        Multiple   0    Live Births   3          Obstetric Comments   No complications. Term inductions.             Menstrual History:     Patient's last menstrual period was 2021.         Current contraception: NuvaRing vaginal inserts  History of abnormal Pap smear: no  Received Gardasil immunization: no  Perform regular self breast exam : yes  Family history of uterine or ovarian cancer: no  Family History of colon cancer: no  Family history of breast cancer: no     Pancreatic MGM 50s     Mammogram: not indicated.  Colonoscopy: not indicated.  DEXA: not indicated.    Exercise: not active  Calcium/Vitamin D: inadequate intake    The following portions of the patient's history were reviewed and updated as appropriate: allergies, current medications, past family history, past medical history, past social history, past surgical history and problem list.    Review of Systems    Review of Systems   Constitutional: Negative for fatigue.   Respiratory: Negative for shortness of breath.   "  Gastrointestinal: Negative for abdominal pain.   Genitourinary: Negative for dysuria.   Neurological: Negative for headaches.   Psychiatric/Behavioral: Negative for dysphoric mood.     Objective   Physical Exam    /64   Ht 162.6 cm (64\")   Wt 85.7 kg (189 lb)   LMP 03/01/2021   Breastfeeding No   BMI 32.44 kg/m²     General:   alert, appears stated age and cooperative   Neck: thyroid normal to palpation   Heart: regular rate and rhythm   Lungs: clear to auscultation bilaterally   Abdomen: soft, non-tender, without masses or organomegaly   Breast: inspection negative, no nipple discharge or bleeding, no masses or nodularity palpable   Vulva: normal, Bartholin's, Urethra, [place]'s normal   Vagina: normal mucosa, normal discharge   Cervix: no cervical motion tenderness and no lesions   Uterus: non-tender, normal shape and consistency   Adnexa: no mass, fullness, tenderness   Rectal: not indicated     Assessment/Plan   Diagnoses and all orders for this visit:    1. Encounter for gynecological examination without abnormal finding (Primary)    Other orders  -     etonogestrel-ethinyl estradiol (NuvaRing) 0.12-0.015 MG/24HR vaginal ring; Insert 1 each into the vagina Every 28 (Twenty-Eight) Days. Insert pv for 3 wks, then remove for 1 wk  Dispense: 3 each; Refill: 3      We discussed risk and benefits of the NuvaRing.  We discussed increased risk of DVT.  Patient was to continue.  Encouraged exercise.  All questions answered.  Breast self exam technique reviewed and patient encouraged to perform self-exam monthly.  Discussed healthy lifestyle modifications.  Recommended 30 minutes of aerobic exercise five times per week.  Discussed calcium needs to prevent osteoporosis.                     "

## 2021-04-16 ENCOUNTER — BULK ORDERING (OUTPATIENT)
Dept: CASE MANAGEMENT | Facility: OTHER | Age: 31
End: 2021-04-16

## 2021-04-16 DIAGNOSIS — Z23 IMMUNIZATION DUE: ICD-10-CM

## 2022-01-11 RX ORDER — FLUCONAZOLE 150 MG/1
TABLET ORAL
Qty: 1 TABLET | Refills: 0 | Status: SHIPPED | OUTPATIENT
Start: 2022-01-11 | End: 2022-06-08 | Stop reason: SDUPTHER

## 2022-03-28 RX ORDER — ETONOGESTREL AND ETHINYL ESTRADIOL 11.7; 2.7 MG/1; MG/1
1 INSERT, EXTENDED RELEASE VAGINAL
Qty: 3 EACH | Refills: 0 | Status: SHIPPED | OUTPATIENT
Start: 2022-03-28 | End: 2022-06-27 | Stop reason: SDUPTHER

## 2022-05-03 NOTE — TELEPHONE ENCOUNTER
I sent it in but she will be due for her annual next month so she needs to schedule that appt as well  
Left voice mail to call office  
Pt has stopped breast feeding and requests rx for Nuva Ring.   
Pt notified and scheduled.   
Normal gait / station

## 2022-06-15 RX ORDER — FLUCONAZOLE 150 MG/1
150 TABLET ORAL ONCE
Qty: 1 TABLET | Refills: 0 | Status: SHIPPED | OUTPATIENT
Start: 2022-06-15 | End: 2022-06-15

## 2022-06-27 RX ORDER — ETONOGESTREL AND ETHINYL ESTRADIOL 11.7; 2.7 MG/1; MG/1
1 INSERT, EXTENDED RELEASE VAGINAL
Qty: 3 EACH | Refills: 0 | Status: SHIPPED | OUTPATIENT
Start: 2022-06-27 | End: 2022-07-19 | Stop reason: SDUPTHER

## 2022-07-19 ENCOUNTER — OFFICE VISIT (OUTPATIENT)
Dept: OBSTETRICS AND GYNECOLOGY | Age: 32
End: 2022-07-19

## 2022-07-19 VITALS
SYSTOLIC BLOOD PRESSURE: 108 MMHG | WEIGHT: 195 LBS | BODY MASS INDEX: 33.29 KG/M2 | DIASTOLIC BLOOD PRESSURE: 72 MMHG | HEIGHT: 64 IN

## 2022-07-19 DIAGNOSIS — Z01.419 ENCOUNTER FOR GYNECOLOGICAL EXAMINATION WITHOUT ABNORMAL FINDING: Primary | ICD-10-CM

## 2022-07-19 DIAGNOSIS — Z11.51 SPECIAL SCREENING EXAMINATION FOR HUMAN PAPILLOMAVIRUS (HPV): ICD-10-CM

## 2022-07-19 DIAGNOSIS — Z12.4 SCREENING FOR MALIGNANT NEOPLASM OF THE CERVIX: ICD-10-CM

## 2022-07-19 DIAGNOSIS — F41.9 ANXIETY: ICD-10-CM

## 2022-07-19 PROCEDURE — 99395 PREV VISIT EST AGE 18-39: CPT | Performed by: OBSTETRICS & GYNECOLOGY

## 2022-07-19 RX ORDER — BUPROPION HYDROCHLORIDE 150 MG/1
150 TABLET ORAL DAILY
Qty: 30 TABLET | Refills: 11 | Status: SHIPPED | OUTPATIENT
Start: 2022-07-19

## 2022-07-19 RX ORDER — ETONOGESTREL AND ETHINYL ESTRADIOL 11.7; 2.7 MG/1; MG/1
1 INSERT, EXTENDED RELEASE VAGINAL
Qty: 3 EACH | Refills: 3 | Status: SHIPPED | OUTPATIENT
Start: 2022-07-19 | End: 2023-07-19

## 2022-07-19 NOTE — PROGRESS NOTES
Subjective     Chief Complaint   Patient presents with   • Gynecologic Exam     AC       History of Present Illness    Sindhu Devries is a 32 y.o.  who presents for annual exam.  Patient is using the NuvaRing and is very happy with that.  She does do an extended schedule and has a menses every 2 months.  Patient is having some anxiety.  She tried Zoloft in the past and had a rash.  She would like to try another medication for anxiety.  She is worried about weight gain.  Her children are doing well and are ages 3 7 and 9.  Patient is working for the [employer].    Her menses are regular every 8 weeks, lasting 4 days , dysmenorrhea mild, occurring first 1-2 days of flow   Obstetric History:  OB History        3    Para   3    Term   3            AB        Living   3       SAB        IAB        Ectopic        Molar        Multiple   0    Live Births   3          Obstetric Comments   No complications. Term inductions.             Menstrual History:     No LMP recorded. (Menstrual status: Other).         Current contraception: NuvaRing vaginal inserts  History of abnormal Pap smear: no  Received Gardasil immunization: no  Perform regular self breast exam : no  Family history of uterine or ovarian cancer: no  Family History of colon cancer: no  Family history of breast cancer: no   Pancreatic cancer and maternal grandmother at age 50.  Patient declines genetic testing.    Mammogram: not indicated.  Colonoscopy: not indicated.  DEXA: not indicated.    Exercise: exercises 2 times a week tredmil and walks   Calcium/Vitamin D: adequate intake    The following portions of the patient's history were reviewed and updated as appropriate: allergies, current medications, past family history, past medical history, past social history, past surgical history and problem list.    Review of Systems    Review of Systems   Constitutional: Negative for fatigue.   Respiratory: Negative for shortness of breath.   "  Gastrointestinal: Negative for abdominal pain.   Genitourinary: Negative for dysuria.   Neurological: Negative for headaches.   Psychiatric/Behavioral: Negative for dysphoric mood.     Objective   Physical Exam    /72   Ht 162.6 cm (64\")   Wt 88.5 kg (195 lb)   Breastfeeding No   BMI 33.47 kg/m²     General:   alert, appears stated age and cooperative   Neck: thyroid normal to palpation   Heart: regular rate and rhythm   Lungs: clear to auscultation bilaterally   Abdomen: soft, non-tender, without masses or organomegaly   Breast: inspection negative, no nipple discharge or bleeding, no masses or nodularity palpable   Vulva: normal, Bartholin's, Urethra, [place]'s normal   Vagina: normal mucosa, normal discharge   Cervix: no cervical motion tenderness and no lesions   Uterus: non-tender, normal shape and consistency   Adnexa: no mass, fullness, tenderness   Rectal: not indicated     Assessment & Plan   Diagnoses and all orders for this visit:    1. Encounter for gynecological examination without abnormal finding (Primary)    2. Anxiety    Other orders  -     buPROPion XL (Wellbutrin XL) 150 MG 24 hr tablet; Take 1 tablet by mouth Daily.  Dispense: 30 tablet; Refill: 11  -     etonogestrel-ethinyl estradiol (NuvaRing) 0.12-0.015 MG/24HR vaginal ring; Insert 1 each into the vagina Every 28 (Twenty-Eight) Days. Insert pv for 3 wks, then remove for 1 wk  Dispense: 3 each; Refill: 3    Will start a trial of Wellbutrin for anxiety.  We discussed vivid dreams.  Patient had a rash with Zoloft.  If Wellbutrin is not helpful we could try an SSRI.  We will start with a dose of 150 mg daily.  If helpful but not enough we can increase to 300.    Continue NuvaRing.    Patient will look for a primary care doctor.    All questions answered.  Breast self exam technique reviewed and patient encouraged to perform self-exam monthly.  Discussed healthy lifestyle modifications.  Recommended 30 minutes of aerobic exercise five " times per week.  Discussed calcium needs to prevent osteoporosis.

## 2022-07-21 LAB
CYTOLOGIST CVX/VAG CYTO: NORMAL
CYTOLOGY CVX/VAG DOC CYTO: NORMAL
CYTOLOGY CVX/VAG DOC THIN PREP: NORMAL
DX ICD CODE: NORMAL
HIV 1 & 2 AB SER-IMP: NORMAL
HPV I/H RISK 4 DNA CVX QL PROBE+SIG AMP: NEGATIVE
OTHER STN SPEC: NORMAL
STAT OF ADQ CVX/VAG CYTO-IMP: NORMAL

## 2023-12-04 RX ORDER — ETONOGESTREL AND ETHINYL ESTRADIOL VAGINAL .015; .12 MG/D; MG/D
1 RING VAGINAL
Qty: 3 EACH | Refills: 3 | Status: SHIPPED | OUTPATIENT
Start: 2023-12-04 | End: 2024-12-03

## 2024-02-29 ENCOUNTER — OFFICE VISIT (OUTPATIENT)
Dept: OBSTETRICS AND GYNECOLOGY | Age: 34
End: 2024-02-29
Payer: COMMERCIAL

## 2024-02-29 VITALS
HEIGHT: 64 IN | WEIGHT: 205 LBS | SYSTOLIC BLOOD PRESSURE: 128 MMHG | DIASTOLIC BLOOD PRESSURE: 82 MMHG | BODY MASS INDEX: 35 KG/M2

## 2024-02-29 DIAGNOSIS — Z01.419 ENCOUNTER FOR GYNECOLOGICAL EXAMINATION WITHOUT ABNORMAL FINDING: Primary | ICD-10-CM

## 2024-02-29 PROCEDURE — 99395 PREV VISIT EST AGE 18-39: CPT | Performed by: OBSTETRICS & GYNECOLOGY

## 2024-02-29 RX ORDER — ETONOGESTREL AND ETHINYL ESTRADIOL VAGINAL RING .015; .12 MG/D; MG/D
1 RING VAGINAL
Qty: 3 EACH | Refills: 3 | Status: SHIPPED | OUTPATIENT
Start: 2024-02-29 | End: 2025-02-28

## 2024-02-29 NOTE — PROGRESS NOTES
Subjective     Chief Complaint   Patient presents with    Gynecologic Exam     AC       History of Present Illness    Sindhu Devries is a 33 y.o.  who presents for annual exam.  Patient stopped using the NuvaRing for about a year and they use condoms but she restarted because she had significant acne.  She wants to continue.  Previously used an extended interval but had a lot of breakthrough bleeding so now she is having a monthly cycle.  Her menses are  regular every 28-30 days, lasting  4 days  , dysmenorrhea mild, occurring first 1-2 days of flow   Obstetric History:  OB History          3    Para   3    Term   3            AB        Living   3         SAB        IAB        Ectopic        Molar        Multiple   0    Live Births   3          Obstetric Comments   No complications. Term inductions.               Menstrual History:     Patient's last menstrual period was 2024 (approximate).         Current contraception: NuvaRing vaginal inserts  History of abnormal Pap smear: no  Received Gardasil immunization: no  Perform regular self breast exam : no  Family history of uterine or ovarian cancer: no  Family History of colon cancer: no  Family history of breast cancer: no  [facility]  - declines  testing   Mammogram: not indicated.  Colonoscopy: not indicated.  DEXA: not indicated.    Exercise: exercises 3 times a week   Calcium/Vitamin D: adequate intake    The following portions of the patient's history were reviewed and updated as appropriate: allergies, current medications, past family history, past medical history, past social history, past surgical history, and problem list.    Review of Systems    Review of Systems   Constitutional: Negative for fatigue.   Respiratory: Negative for shortness of breath.    Gastrointestinal: Negative for abdominal pain.   Genitourinary: Negative for dysuria.   Neurological: Negative for headaches.   Psychiatric/Behavioral: Negative for dysphoric mood.  "    Objective   Physical Exam    /82   Ht 162.6 cm (64\")   Wt 93 kg (205 lb)   LMP 02/20/2024 (Approximate)   BMI 35.19 kg/m²     General:   alert, appears stated age and cooperative   Neck: thyroid normal to palpation   Heart: regular rate and rhythm   Lungs: clear to auscultation bilaterally   Abdomen: soft, non-tender, without masses or organomegaly   Breast: inspection negative, no nipple discharge or bleeding, no masses or nodularity palpable   Vulva: normal, Bartholin's, Urethra, [place]'s normal   Vagina: normal mucosa, normal discharge   Cervix: no cervical motion tenderness and no lesions   Uterus: non-tender, normal shape and consistency   Adnexa: no mass, fullness, tenderness   Rectal: not indicated     Assessment & Plan   Diagnoses and all orders for this visit:    1. Encounter for gynecological examination without abnormal finding (Primary)    Other orders  -     etonogestrel-ethinyl estradiol (NuvaRing) 0.12-0.015 MG/24HR vaginal ring; Insert 1 each into the vagina Every 28 (Twenty-Eight) Days. Insert pv for 3 wks, then remove for 1 wk  Dispense: 3 each; Refill: 3    Discussed risk and benefits of the NuvaRing including risk of DVT heart attack and stroke.  Patient wishes to continue.  Plan for Pap smear next year.    All questions answered.  Breast self exam technique reviewed and patient encouraged to perform self-exam monthly.  Discussed healthy lifestyle modifications.  Recommended 30 minutes of aerobic exercise five times per week.  Discussed calcium needs to prevent osteoporosis.                     "

## 2025-04-02 RX ORDER — ETONOGESTREL AND ETHINYL ESTRADIOL .12; .015 MG/D; MG/D
RING VAGINAL
Qty: 3 EACH | Refills: 3 | OUTPATIENT
Start: 2025-04-02

## 2025-04-02 NOTE — TELEPHONE ENCOUNTER
Patient needs annual, left voicemail to callback to schedule.    Patient/surrogate refused vaccine...

## 2025-08-20 ENCOUNTER — OFFICE VISIT (OUTPATIENT)
Dept: OBSTETRICS AND GYNECOLOGY | Age: 35
End: 2025-08-20
Payer: COMMERCIAL

## 2025-08-20 VITALS — DIASTOLIC BLOOD PRESSURE: 70 MMHG | SYSTOLIC BLOOD PRESSURE: 108 MMHG | BODY MASS INDEX: 22.83 KG/M2 | WEIGHT: 133 LBS

## 2025-08-20 DIAGNOSIS — Z01.419 WELL FEMALE EXAM WITH ROUTINE GYNECOLOGICAL EXAM: Primary | ICD-10-CM

## 2025-08-20 DIAGNOSIS — Z11.51 SCREENING FOR HUMAN PAPILLOMAVIRUS (HPV): ICD-10-CM

## 2025-08-20 DIAGNOSIS — Z12.4 SCREENING FOR MALIGNANT NEOPLASM OF CERVIX: ICD-10-CM

## 2025-08-20 RX ORDER — ETONOGESTREL AND ETHINYL ESTRADIOL VAGINAL RING .015; .12 MG/D; MG/D
1 RING VAGINAL
Qty: 3 EACH | Refills: 1 | Status: SHIPPED | OUTPATIENT
Start: 2025-08-20 | End: 2026-08-20

## 2025-08-20 RX ORDER — GLYCOPYRROLATE 1 MG/1
1 TABLET ORAL 2 TIMES DAILY
COMMUNITY

## 2025-08-22 LAB
CYTOLOGIST CVX/VAG CYTO: NORMAL
CYTOLOGY CVX/VAG DOC CYTO: NORMAL
CYTOLOGY CVX/VAG DOC THIN PREP: NORMAL
DX ICD CODE: NORMAL
HPV I/H RISK 4 DNA CVX QL PROBE+SIG AMP: NEGATIVE
OTHER STN SPEC: NORMAL
SERVICE CMNT-IMP: NORMAL
STAT OF ADQ CVX/VAG CYTO-IMP: NORMAL